# Patient Record
Sex: MALE | Race: WHITE | NOT HISPANIC OR LATINO
[De-identification: names, ages, dates, MRNs, and addresses within clinical notes are randomized per-mention and may not be internally consistent; named-entity substitution may affect disease eponyms.]

---

## 2017-02-23 PROBLEM — Z00.00 ENCOUNTER FOR PREVENTIVE HEALTH EXAMINATION: Status: ACTIVE | Noted: 2017-02-23

## 2017-03-23 PROBLEM — M25.511 RIGHT SHOULDER PAIN, UNSPECIFIED CHRONICITY: Status: ACTIVE | Noted: 2017-03-23

## 2017-03-24 ENCOUNTER — APPOINTMENT (OUTPATIENT)
Dept: ORTHOPEDIC SURGERY | Facility: CLINIC | Age: 52
End: 2017-03-24

## 2017-03-24 VITALS
DIASTOLIC BLOOD PRESSURE: 93 MMHG | HEIGHT: 69 IN | HEART RATE: 65 BPM | BODY MASS INDEX: 37.03 KG/M2 | WEIGHT: 250 LBS | SYSTOLIC BLOOD PRESSURE: 153 MMHG

## 2017-03-24 DIAGNOSIS — M19.019 PRIMARY OSTEOARTHRITIS, UNSPECIFIED SHOULDER: ICD-10-CM

## 2017-03-24 DIAGNOSIS — Z87.09 PERSONAL HISTORY OF OTHER DISEASES OF THE RESPIRATORY SYSTEM: ICD-10-CM

## 2017-03-24 DIAGNOSIS — Z87.891 PERSONAL HISTORY OF NICOTINE DEPENDENCE: ICD-10-CM

## 2017-03-24 DIAGNOSIS — M25.511 PAIN IN RIGHT SHOULDER: ICD-10-CM

## 2017-03-24 DIAGNOSIS — M75.51 BURSITIS OF RIGHT SHOULDER: ICD-10-CM

## 2017-03-24 DIAGNOSIS — Z80.9 FAMILY HISTORY OF MALIGNANT NEOPLASM, UNSPECIFIED: ICD-10-CM

## 2017-03-24 DIAGNOSIS — Z78.9 OTHER SPECIFIED HEALTH STATUS: ICD-10-CM

## 2017-03-24 DIAGNOSIS — Z82.61 FAMILY HISTORY OF ARTHRITIS: ICD-10-CM

## 2017-04-05 ENCOUNTER — FORM ENCOUNTER (OUTPATIENT)
Age: 52
End: 2017-04-05

## 2017-04-06 ENCOUNTER — OUTPATIENT (OUTPATIENT)
Dept: OUTPATIENT SERVICES | Facility: HOSPITAL | Age: 52
LOS: 1 days | End: 2017-04-06
Payer: COMMERCIAL

## 2017-04-06 ENCOUNTER — APPOINTMENT (OUTPATIENT)
Dept: MRI IMAGING | Facility: CLINIC | Age: 52
End: 2017-04-06

## 2017-04-06 DIAGNOSIS — M19.019 PRIMARY OSTEOARTHRITIS, UNSPECIFIED SHOULDER: ICD-10-CM

## 2017-04-06 DIAGNOSIS — M75.51 BURSITIS OF RIGHT SHOULDER: ICD-10-CM

## 2017-04-06 DIAGNOSIS — M25.511 PAIN IN RIGHT SHOULDER: ICD-10-CM

## 2017-04-06 PROCEDURE — 73221 MRI JOINT UPR EXTREM W/O DYE: CPT

## 2018-07-23 PROBLEM — Z78.9 ALCOHOL USE: Status: ACTIVE | Noted: 2017-03-24

## 2020-01-23 ENCOUNTER — APPOINTMENT (OUTPATIENT)
Dept: PULMONOLOGY | Facility: CLINIC | Age: 55
End: 2020-01-23

## 2023-02-04 ENCOUNTER — EMERGENCY (EMERGENCY)
Facility: HOSPITAL | Age: 58
LOS: 1 days | Discharge: HOME | End: 2023-02-04
Attending: EMERGENCY MEDICINE
Payer: COMMERCIAL

## 2023-02-04 VITALS
HEART RATE: 91 BPM | DIASTOLIC BLOOD PRESSURE: 104 MMHG | TEMPERATURE: 98 F | HEIGHT: 69 IN | OXYGEN SATURATION: 96 % | RESPIRATION RATE: 18 BRPM | SYSTOLIC BLOOD PRESSURE: 163 MMHG | WEIGHT: 235.23 LBS

## 2023-02-04 PROCEDURE — 99284 EMERGENCY DEPT VISIT MOD MDM: CPT | Mod: 25

## 2023-02-04 PROCEDURE — 12001 RPR S/N/AX/GEN/TRNK 2.5CM/<: CPT

## 2023-02-04 PROCEDURE — 99283 EMERGENCY DEPT VISIT LOW MDM: CPT

## 2023-02-04 RX ORDER — LIDOCAINE HCL 20 MG/ML
6 VIAL (ML) INJECTION ONCE
Refills: 0 | Status: COMPLETED | OUTPATIENT
Start: 2023-02-04 | End: 2023-02-04

## 2023-02-04 RX ORDER — CEPHALEXIN 500 MG
500 CAPSULE ORAL ONCE
Refills: 0 | Status: DISCONTINUED | OUTPATIENT
Start: 2023-02-04 | End: 2023-02-04

## 2023-02-04 RX ADMIN — Medication 6 MILLILITER(S): at 13:29

## 2023-02-04 RX ADMIN — Medication 100 MILLIGRAM(S): at 13:28

## 2023-02-04 NOTE — ED PROVIDER NOTE - CLINICAL SUMMARY MEDICAL DECISION MAKING FREE TEXT BOX
L hand laceration  -Lac repaired with 3 loose sutures  -Given doxy as ppx since wound is old (allergic to penicillin and cephalosporins)  -Given f/u info for hand incase decrease sensation doesn't resolve  -Discussed wound care and return precautions  -F/u in 10 days for suture removal

## 2023-02-04 NOTE — ED PROVIDER NOTE - MUSCULOSKELETAL, MLM
L thumb with 1.5 cm across base of L thumb, 5/5 strength of thumb in all directions. Sensation intact dorsally, laterally and medially, small area of ventral surface with decrease sensation. Entire depth of wound visualized, no tendon injury or FB.

## 2023-02-04 NOTE — ED PROVIDER NOTE - NS ED ATTENDING STATEMENT MOD
This was a shared visit with the ELAINE. I reviewed and verified the documentation and independently performed the documented:

## 2023-02-04 NOTE — ED PROVIDER NOTE - OBJECTIVE STATEMENT
57 M hx HTN presents to ed c/o L hand laceration at base of L thumb from broken ceramic at 10 PM last night. States he cleaned it out well, came in today because it kept bleeding. C/o decrease sensation to area of ventral surface of L thumb. Denies any other complaints injuries. Unknown last tetanus.

## 2023-02-04 NOTE — ED PROVIDER NOTE - ATTENDING APP SHARED VISIT CONTRIBUTION OF CARE
I agree with the PA's note and was available for any issues/concerns. I was directly involved in patient care. My brief overall assessment is as follows:      56yo M with laceration to hand last night. No tendon injury. wound cleaned and sutured. outpt Follow up

## 2023-02-04 NOTE — ED PROVIDER NOTE - PATIENT PORTAL LINK FT
You can access the FollowMyHealth Patient Portal offered by [facility] by registering at the following website: [email or web address]. By joining [employer]’s FollowMyHealth portal, you will also be able to view your health information using other applications (apps) compatible with our system.

## 2023-02-04 NOTE — ED PROVIDER NOTE - CARE PROVIDER_API CALL
Shweta See)  Orthopaedic Surgery; Surgery of the Hand  [address] [place] [address]  Phone: [phone]  Fax: [phone]  Follow Up Time: 7-10 Days

## 2023-02-04 NOTE — ED ADULT NURSE NOTE - OBJECTIVE STATEMENT
Patient is alert and oriented X4 from home. Patient comes in to the ER from work and states he cut left hand on a piece of ceramic. Laceration observed to the left hand

## 2023-11-24 ENCOUNTER — OFFICE (OUTPATIENT)
Dept: URBAN - METROPOLITAN AREA CLINIC 6 | Facility: CLINIC | Age: 58
Setting detail: OPHTHALMOLOGY
End: 2023-11-24
Payer: COMMERCIAL

## 2023-11-24 DIAGNOSIS — D31.02: ICD-10-CM

## 2023-11-24 DIAGNOSIS — H02.824: ICD-10-CM

## 2023-11-24 DIAGNOSIS — H02.831: ICD-10-CM

## 2023-11-24 DIAGNOSIS — H43.22: ICD-10-CM

## 2023-11-24 DIAGNOSIS — H02.834: ICD-10-CM

## 2023-11-24 DIAGNOSIS — H11.151: ICD-10-CM

## 2023-11-24 PROCEDURE — 92004 COMPRE OPH EXAM NEW PT 1/>: CPT | Performed by: OPHTHALMOLOGY

## 2023-11-24 ASSESSMENT — REFRACTION_MANIFEST
OD_AXIS: 105
OD_VA1: 20/20
OS_VA1: 20/20
OS_AXIS: 075
OD_CYLINDER: -1.75
OS_CYLINDER: -1.00
OS_SPHERE: PLANO
OD_SPHERE: +0.75
OU_VA: 20/20

## 2023-11-24 ASSESSMENT — SPHEQUIV_DERIVED
OD_SPHEQUIV: -0.125
OS_SPHEQUIV: -0.25
OD_SPHEQUIV: -0.125

## 2023-11-24 ASSESSMENT — REFRACTION_AUTOREFRACTION
OD_SPHERE: +0.25
OS_AXIS: 076
OD_CYLINDER: -0.75
OD_AXIS: 106
OS_SPHERE: 0.00
OS_CYLINDER: -0.50

## 2023-11-24 ASSESSMENT — CONFRONTATIONAL VISUAL FIELD TEST (CVF)
OS_FINDINGS: FULL
OD_FINDINGS: FULL

## 2023-11-24 ASSESSMENT — LID POSITION - DERMATOCHALASIS
OS_DERMATOCHALASIS: LUL 1+
OD_DERMATOCHALASIS: RUL 1+

## 2023-12-12 ENCOUNTER — OFFICE (OUTPATIENT)
Dept: URBAN - METROPOLITAN AREA CLINIC 6 | Facility: CLINIC | Age: 58
Setting detail: OPHTHALMOLOGY
End: 2023-12-12
Payer: COMMERCIAL

## 2023-12-12 DIAGNOSIS — H02.834: ICD-10-CM

## 2023-12-12 DIAGNOSIS — H25.13: ICD-10-CM

## 2023-12-12 DIAGNOSIS — H02.824: ICD-10-CM

## 2023-12-12 DIAGNOSIS — H02.831: ICD-10-CM

## 2023-12-12 DIAGNOSIS — H11.151: ICD-10-CM

## 2023-12-12 DIAGNOSIS — D48.9: ICD-10-CM

## 2023-12-12 PROCEDURE — 92285 EXTERNAL OCULAR PHOTOGRAPHY: CPT | Performed by: OPHTHALMOLOGY

## 2023-12-12 PROCEDURE — 99214 OFFICE O/P EST MOD 30 MIN: CPT | Performed by: OPHTHALMOLOGY

## 2023-12-12 ASSESSMENT — REFRACTION_MANIFEST
OS_SPHERE: PLANO
OS_AXIS: 075
OD_SPHERE: +0.75
OD_CYLINDER: -1.75
OS_VA1: 20/20
OD_AXIS: 105
OD_VA1: 20/20
OU_VA: 20/20
OS_CYLINDER: -1.00

## 2023-12-12 ASSESSMENT — LID POSITION - DERMATOCHALASIS
OS_DERMATOCHALASIS: LUL 1+
OD_DERMATOCHALASIS: RUL 1+

## 2023-12-12 ASSESSMENT — REFRACTION_AUTOREFRACTION
OS_AXIS: 076
OD_CYLINDER: -1.00
OD_AXIS: 106
OD_SPHERE: +0.50
OS_SPHERE: 0.00
OS_CYLINDER: -0.50

## 2023-12-12 ASSESSMENT — CONFRONTATIONAL VISUAL FIELD TEST (CVF)
OS_FINDINGS: FULL
OD_FINDINGS: FULL

## 2023-12-12 ASSESSMENT — SPHEQUIV_DERIVED
OD_SPHEQUIV: 0
OD_SPHEQUIV: -0.125
OS_SPHEQUIV: -0.25

## 2024-01-02 ENCOUNTER — NON-APPOINTMENT (OUTPATIENT)
Age: 59
End: 2024-01-02

## 2024-12-25 PROBLEM — F10.90 ALCOHOL USE: Status: ACTIVE | Noted: 2017-03-24

## 2025-02-10 ENCOUNTER — NON-APPOINTMENT (OUTPATIENT)
Age: 60
End: 2025-02-10

## 2025-02-14 ENCOUNTER — INPATIENT (INPATIENT)
Facility: HOSPITAL | Age: 60
LOS: 4 days | Discharge: HOME | DRG: 871 | End: 2025-02-19
Attending: STUDENT IN AN ORGANIZED HEALTH CARE EDUCATION/TRAINING PROGRAM | Admitting: HOSPITALIST
Payer: COMMERCIAL

## 2025-02-14 VITALS
TEMPERATURE: 98 F | OXYGEN SATURATION: 94 % | SYSTOLIC BLOOD PRESSURE: 99 MMHG | HEART RATE: 80 BPM | WEIGHT: 176.37 LBS | DIASTOLIC BLOOD PRESSURE: 59 MMHG | RESPIRATION RATE: 20 BRPM

## 2025-02-14 LAB
APTT BLD: 28.6 SEC — SIGNIFICANT CHANGE UP (ref 24.5–35.6)
HCT VFR BLD CALC: 41.1 % — SIGNIFICANT CHANGE UP (ref 39–50)
HGB BLD-MCNC: 14.5 G/DL — SIGNIFICANT CHANGE UP (ref 13–17)
INR BLD: 1.3 RATIO — HIGH (ref 0.85–1.16)
MCHC RBC-ENTMCNC: 30.8 PG — SIGNIFICANT CHANGE UP (ref 27–34)
MCHC RBC-ENTMCNC: 35.3 G/DL — SIGNIFICANT CHANGE UP (ref 32–36)
MCV RBC AUTO: 87.3 FL — SIGNIFICANT CHANGE UP (ref 80–100)
NRBC # BLD AUTO: 0 K/UL — SIGNIFICANT CHANGE UP (ref 0–0)
NRBC # FLD: 0 K/UL — SIGNIFICANT CHANGE UP (ref 0–0)
NRBC BLD AUTO-RTO: 0 /100 WBCS — SIGNIFICANT CHANGE UP (ref 0–0)
PLATELET # BLD AUTO: 405 K/UL — HIGH (ref 150–400)
PMV BLD: 9.5 FL — SIGNIFICANT CHANGE UP (ref 7–13)
PROTHROM AB SERPL-ACNC: 14.7 SEC — HIGH (ref 9.9–13.4)
RBC # BLD: 4.71 M/UL — SIGNIFICANT CHANGE UP (ref 4.2–5.8)
RBC # FLD: 14.3 % — SIGNIFICANT CHANGE UP (ref 10.3–14.5)
WBC # BLD: 27.75 K/UL — HIGH (ref 3.8–10.5)
WBC # FLD AUTO: 27.75 K/UL — HIGH (ref 3.8–10.5)

## 2025-02-14 PROCEDURE — 71045 X-RAY EXAM CHEST 1 VIEW: CPT | Mod: 26

## 2025-02-14 PROCEDURE — 93010 ELECTROCARDIOGRAM REPORT: CPT

## 2025-02-14 PROCEDURE — 99285 EMERGENCY DEPT VISIT HI MDM: CPT

## 2025-02-14 RX ORDER — CEFEPIME 2 G/20ML
2000 INJECTION, POWDER, FOR SOLUTION INTRAVENOUS ONCE
Refills: 0 | Status: DISCONTINUED | OUTPATIENT
Start: 2025-02-14 | End: 2025-02-15

## 2025-02-14 RX ORDER — VANCOMYCIN HCL IN 5 % DEXTROSE 1.5G/250ML
1000 PLASTIC BAG, INJECTION (ML) INTRAVENOUS ONCE
Refills: 0 | Status: COMPLETED | OUTPATIENT
Start: 2025-02-14 | End: 2025-02-14

## 2025-02-14 NOTE — ED ADULT TRIAGE NOTE - BP NONINVASIVE SYSTOLIC (MM HG)
Problem: Risk for Injury, Hyperbaric Oxygen Therapy (HBO Department)  Goal: Remains free from injury (e.g. oxygen toxicity, fire risk)  Outcome: Monitoring/Evaluating progress     Pt tolerated compression. Encouraged repositioning throughout. HBO treatment tolerated.      99

## 2025-02-14 NOTE — ED ADULT TRIAGE NOTE - CHIEF COMPLAINT QUOTE
pt biba for shortness of breath, diagnosed with pneumonia on doxycycline, sent from urgent care for systolics in the 90s. patient received albuterol with ems

## 2025-02-15 DIAGNOSIS — A41.9 SEPSIS, UNSPECIFIED ORGANISM: ICD-10-CM

## 2025-02-15 LAB
ACANTHOCYTES BLD QL SMEAR: ABNORMAL
ALBUMIN SERPL ELPH-MCNC: 2.1 G/DL — LOW (ref 3.3–5.2)
ALBUMIN SERPL ELPH-MCNC: 2.5 G/DL — LOW (ref 3.3–5.2)
ALP SERPL-CCNC: 105 U/L — SIGNIFICANT CHANGE UP (ref 40–120)
ALP SERPL-CCNC: 127 U/L — HIGH (ref 40–120)
ALT FLD-CCNC: 45 U/L — HIGH
ALT FLD-CCNC: 61 U/L — HIGH
ANION GAP SERPL CALC-SCNC: 15 MMOL/L — SIGNIFICANT CHANGE UP (ref 5–17)
ANION GAP SERPL CALC-SCNC: 17 MMOL/L — SIGNIFICANT CHANGE UP (ref 5–17)
APTT BLD: 34.1 SEC — SIGNIFICANT CHANGE UP (ref 24.5–35.6)
AST SERPL-CCNC: 51 U/L — HIGH
AST SERPL-CCNC: 74 U/L — HIGH
BASOPHILS # BLD AUTO: 0.02 K/UL — SIGNIFICANT CHANGE UP (ref 0–0.2)
BASOPHILS # BLD AUTO: 0.18 K/UL — SIGNIFICANT CHANGE UP (ref 0–0.2)
BASOPHILS # BLD MANUAL: 0 K/UL — SIGNIFICANT CHANGE UP (ref 0–0.2)
BASOPHILS # BLD MANUAL: 0 K/UL — SIGNIFICANT CHANGE UP (ref 0–0.2)
BASOPHILS NFR BLD AUTO: 0.1 % — SIGNIFICANT CHANGE UP (ref 0–2)
BASOPHILS NFR BLD AUTO: 0.6 % — SIGNIFICANT CHANGE UP (ref 0–2)
BASOPHILS NFR BLD MANUAL: 0 % — SIGNIFICANT CHANGE UP (ref 0–2)
BASOPHILS NFR BLD MANUAL: 0 % — SIGNIFICANT CHANGE UP (ref 0–2)
BILIRUB SERPL-MCNC: 0.8 MG/DL — SIGNIFICANT CHANGE UP (ref 0.4–2)
BILIRUB SERPL-MCNC: 0.8 MG/DL — SIGNIFICANT CHANGE UP (ref 0.4–2)
BUN SERPL-MCNC: 37.9 MG/DL — HIGH (ref 8–20)
BUN SERPL-MCNC: 44.1 MG/DL — HIGH (ref 8–20)
BURR CELLS BLD QL SMEAR: SLIGHT — SIGNIFICANT CHANGE UP
CALCIUM SERPL-MCNC: 7.8 MG/DL — LOW (ref 8.4–10.5)
CALCIUM SERPL-MCNC: 8.4 MG/DL — SIGNIFICANT CHANGE UP (ref 8.4–10.5)
CHLORIDE SERPL-SCNC: 102 MMOL/L — SIGNIFICANT CHANGE UP (ref 96–108)
CHLORIDE SERPL-SCNC: 98 MMOL/L — SIGNIFICANT CHANGE UP (ref 96–108)
CO2 SERPL-SCNC: 17 MMOL/L — LOW (ref 22–29)
CO2 SERPL-SCNC: 21 MMOL/L — LOW (ref 22–29)
CREAT SERPL-MCNC: 1.17 MG/DL — SIGNIFICANT CHANGE UP (ref 0.5–1.3)
CREAT SERPL-MCNC: 1.72 MG/DL — HIGH (ref 0.5–1.3)
DACRYOCYTES BLD QL SMEAR: ABNORMAL
EGFR: 45 ML/MIN/1.73M2 — LOW
EGFR: 71 ML/MIN/1.73M2 — SIGNIFICANT CHANGE UP
ELLIPTOCYTES BLD QL SMEAR: ABNORMAL
EOSINOPHIL # BLD AUTO: 0 K/UL — SIGNIFICANT CHANGE UP (ref 0–0.5)
EOSINOPHIL # BLD AUTO: 0 K/UL — SIGNIFICANT CHANGE UP (ref 0–0.5)
EOSINOPHIL # BLD MANUAL: 0 K/UL — SIGNIFICANT CHANGE UP (ref 0–0.5)
EOSINOPHIL # BLD MANUAL: 0 K/UL — SIGNIFICANT CHANGE UP (ref 0–0.5)
EOSINOPHIL NFR BLD AUTO: 0 % — SIGNIFICANT CHANGE UP (ref 0–6)
EOSINOPHIL NFR BLD AUTO: 0 % — SIGNIFICANT CHANGE UP (ref 0–6)
EOSINOPHIL NFR BLD MANUAL: 0 % — SIGNIFICANT CHANGE UP (ref 0–6)
EOSINOPHIL NFR BLD MANUAL: 0 % — SIGNIFICANT CHANGE UP (ref 0–6)
FLUAV AG NPH QL: SIGNIFICANT CHANGE UP
FLUBV AG NPH QL: SIGNIFICANT CHANGE UP
GIANT PLATELETS BLD QL SMEAR: PRESENT
GIANT PLATELETS BLD QL SMEAR: PRESENT
GLUCOSE SERPL-MCNC: 103 MG/DL — HIGH (ref 70–99)
GLUCOSE SERPL-MCNC: 108 MG/DL — HIGH (ref 70–99)
GRAM STN FLD: ABNORMAL
HCT VFR BLD CALC: 39.3 % — SIGNIFICANT CHANGE UP (ref 39–50)
HCT VFR BLD CALC: 39.7 % — SIGNIFICANT CHANGE UP (ref 39–50)
HGB BLD-MCNC: 13.6 G/DL — SIGNIFICANT CHANGE UP (ref 13–17)
HGB BLD-MCNC: 13.7 G/DL — SIGNIFICANT CHANGE UP (ref 13–17)
IMM GRANULOCYTES # BLD AUTO: 0.17 K/UL — HIGH (ref 0–0.07)
IMM GRANULOCYTES # BLD AUTO: 0.37 K/UL — HIGH (ref 0–0.07)
IMM GRANULOCYTES NFR BLD AUTO: 0.6 % — SIGNIFICANT CHANGE UP (ref 0–0.9)
IMM GRANULOCYTES NFR BLD AUTO: 1.2 % — HIGH (ref 0–0.9)
LACTATE SERPL-SCNC: 1.5 MMOL/L — SIGNIFICANT CHANGE UP (ref 0.5–2)
LACTATE SERPL-SCNC: 2.5 MMOL/L — HIGH (ref 0.5–2)
LACTATE SERPL-SCNC: 2.6 MMOL/L — HIGH (ref 0.5–2)
LYMPHOCYTES # BLD AUTO: 0.59 K/UL — LOW (ref 1–3.3)
LYMPHOCYTES # BLD AUTO: 0.7 K/UL — LOW (ref 1–3.3)
LYMPHOCYTES # BLD MANUAL: 0 K/UL — LOW (ref 1–3.3)
LYMPHOCYTES # BLD MANUAL: 0.44 K/UL — LOW (ref 1–3.3)
LYMPHOCYTES NFR BLD AUTO: 1.8 % — LOW (ref 13–44)
LYMPHOCYTES NFR BLD AUTO: 2.5 % — LOW (ref 13–44)
LYMPHOCYTES NFR BLD MANUAL: 0 % — LOW (ref 13–44)
LYMPHOCYTES NFR BLD MANUAL: 1.6 % — LOW (ref 13–44)
MAGNESIUM SERPL-MCNC: 1.8 MG/DL — SIGNIFICANT CHANGE UP (ref 1.6–2.6)
MANUAL METAMYELOCYTE #: 0.54 K/UL — HIGH (ref 0–0)
MANUAL NEUTROPHIL BANDS #: 0.44 K/UL — SIGNIFICANT CHANGE UP (ref 0–0.84)
MANUAL NEUTROPHIL BANDS #: 3.16 K/UL — HIGH (ref 0–0.84)
MANUAL REACTIVE LYMPHOCYTES #: 1.05 K/UL — HIGH (ref 0–0.63)
MCHC RBC-ENTMCNC: 30 PG — SIGNIFICANT CHANGE UP (ref 27–34)
MCHC RBC-ENTMCNC: 30.1 PG — SIGNIFICANT CHANGE UP (ref 27–34)
MCHC RBC-ENTMCNC: 34.3 G/DL — SIGNIFICANT CHANGE UP (ref 32–36)
MCHC RBC-ENTMCNC: 34.9 G/DL — SIGNIFICANT CHANGE UP (ref 32–36)
MCV RBC AUTO: 86.4 FL — SIGNIFICANT CHANGE UP (ref 80–100)
MCV RBC AUTO: 87.4 FL — SIGNIFICANT CHANGE UP (ref 80–100)
METAMYELOCYTES # FLD: 1.7 % — HIGH (ref 0–0)
METAMYELOCYTES NFR BLD: 1.7 % — HIGH (ref 0–0)
METHOD TYPE: SIGNIFICANT CHANGE UP
MONOCYTES # BLD AUTO: 0.32 K/UL — SIGNIFICANT CHANGE UP (ref 0–0.9)
MONOCYTES # BLD AUTO: 0.33 K/UL — SIGNIFICANT CHANGE UP (ref 0–0.9)
MONOCYTES # BLD MANUAL: 0.44 K/UL — SIGNIFICANT CHANGE UP (ref 0–0.9)
MONOCYTES # BLD MANUAL: 1.85 K/UL — HIGH (ref 0–0.9)
MONOCYTES NFR BLD AUTO: 1 % — LOW (ref 2–14)
MONOCYTES NFR BLD AUTO: 1.2 % — LOW (ref 2–14)
MONOCYTES NFR BLD MANUAL: 1.6 % — LOW (ref 2–14)
MONOCYTES NFR BLD MANUAL: 5.8 % — SIGNIFICANT CHANGE UP (ref 2–14)
NEUTROPHILS # BLD AUTO: 26.54 K/UL — HIGH (ref 1.8–7.4)
NEUTROPHILS # BLD AUTO: 30.47 K/UL — HIGH (ref 1.8–7.4)
NEUTROPHILS # BLD MANUAL: 25.33 K/UL — HIGH (ref 1.8–7.4)
NEUTROPHILS # BLD MANUAL: 26.42 K/UL — HIGH (ref 1.8–7.4)
NEUTROPHILS NFR BLD AUTO: 95.4 % — HIGH (ref 43–77)
NEUTROPHILS NFR BLD AUTO: 95.6 % — HIGH (ref 43–77)
NEUTROPHILS NFR BLD MANUAL: 79.3 % — HIGH (ref 43–77)
NEUTROPHILS NFR BLD MANUAL: 95.2 % — HIGH (ref 43–77)
NEUTS BAND # BLD: 1.6 % — SIGNIFICANT CHANGE UP (ref 0–8)
NEUTS BAND # BLD: 9.9 % — HIGH (ref 0–8)
NEUTS BAND NFR BLD: 1.6 % — SIGNIFICANT CHANGE UP (ref 0–8)
NEUTS BAND NFR BLD: 9.9 % — HIGH (ref 0–8)
NRBC # BLD AUTO: 0 K/UL — SIGNIFICANT CHANGE UP (ref 0–0)
NRBC # BLD AUTO: 0 K/UL — SIGNIFICANT CHANGE UP (ref 0–0)
NRBC # FLD: 0 K/UL — SIGNIFICANT CHANGE UP (ref 0–0)
NRBC # FLD: 0 K/UL — SIGNIFICANT CHANGE UP (ref 0–0)
NRBC BLD AUTO-RTO: 0 /100 WBCS — SIGNIFICANT CHANGE UP (ref 0–0)
NRBC BLD AUTO-RTO: 0 /100 WBCS — SIGNIFICANT CHANGE UP (ref 0–0)
OVALOCYTES BLD QL SMEAR: ABNORMAL
PHOSPHATE SERPL-MCNC: 3 MG/DL — SIGNIFICANT CHANGE UP (ref 2.4–4.7)
PLAT MORPH BLD: ABNORMAL
PLAT MORPH BLD: NORMAL — SIGNIFICANT CHANGE UP
PLATELET # BLD AUTO: 419 K/UL — HIGH (ref 150–400)
PLATELET # BLD AUTO: 428 K/UL — HIGH (ref 150–400)
PLATELET COUNT - ESTIMATE: NORMAL — SIGNIFICANT CHANGE UP
PMV BLD: 9.5 FL — SIGNIFICANT CHANGE UP (ref 7–13)
PMV BLD: 9.7 FL — SIGNIFICANT CHANGE UP (ref 7–13)
POIKILOCYTOSIS BLD QL AUTO: ABNORMAL
POIKILOCYTOSIS BLD QL AUTO: SLIGHT — SIGNIFICANT CHANGE UP
POLYCHROMASIA BLD QL SMEAR: SLIGHT — SIGNIFICANT CHANGE UP
POLYCHROMASIA BLD QL SMEAR: SLIGHT — SIGNIFICANT CHANGE UP
POTASSIUM SERPL-MCNC: 3.9 MMOL/L — SIGNIFICANT CHANGE UP (ref 3.5–5.3)
POTASSIUM SERPL-MCNC: 4.1 MMOL/L — SIGNIFICANT CHANGE UP (ref 3.5–5.3)
POTASSIUM SERPL-SCNC: 3.9 MMOL/L — SIGNIFICANT CHANGE UP (ref 3.5–5.3)
POTASSIUM SERPL-SCNC: 4.1 MMOL/L — SIGNIFICANT CHANGE UP (ref 3.5–5.3)
PROCALCITONIN SERPL-MCNC: 4.36 NG/ML — HIGH (ref 0.02–0.1)
PROT SERPL-MCNC: 5.9 G/DL — LOW (ref 6.6–8.7)
PROT SERPL-MCNC: 6.4 G/DL — LOW (ref 6.6–8.7)
RAPID RVP RESULT: SIGNIFICANT CHANGE UP
RBC # BLD: 4.54 M/UL — SIGNIFICANT CHANGE UP (ref 4.2–5.8)
RBC # BLD: 4.55 M/UL — SIGNIFICANT CHANGE UP (ref 4.2–5.8)
RBC # FLD: 14.5 % — SIGNIFICANT CHANGE UP (ref 10.3–14.5)
RBC # FLD: 14.5 % — SIGNIFICANT CHANGE UP (ref 10.3–14.5)
RBC BLD AUTO: ABNORMAL
RBC BLD AUTO: ABNORMAL
RSV RNA NPH QL NAA+NON-PROBE: SIGNIFICANT CHANGE UP
S PNEUM DNA BLD POS QL NAA+NON-PROBE: SIGNIFICANT CHANGE UP
SARS-COV-2 RNA SPEC QL NAA+PROBE: SIGNIFICANT CHANGE UP
SARS-COV-2 RNA SPEC QL NAA+PROBE: SIGNIFICANT CHANGE UP
SODIUM SERPL-SCNC: 134 MMOL/L — LOW (ref 135–145)
SODIUM SERPL-SCNC: 135 MMOL/L — SIGNIFICANT CHANGE UP (ref 135–145)
SPECIMEN SOURCE: SIGNIFICANT CHANGE UP
TSH SERPL-MCNC: 0.51 UIU/ML — SIGNIFICANT CHANGE UP (ref 0.27–4.2)
VARIANT LYMPHS # BLD: 3.3 % — SIGNIFICANT CHANGE UP (ref 0–6)
VARIANT LYMPHS NFR BLD MANUAL: 3.3 % — SIGNIFICANT CHANGE UP (ref 0–6)
WBC # BLD: 28.92 K/UL — HIGH (ref 3.8–10.5)
WBC # BLD: 31.94 K/UL — HIGH (ref 3.8–10.5)
WBC # FLD AUTO: 28.92 K/UL — HIGH (ref 3.8–10.5)
WBC # FLD AUTO: 31.94 K/UL — HIGH (ref 3.8–10.5)

## 2025-02-15 PROCEDURE — 71275 CT ANGIOGRAPHY CHEST: CPT | Mod: 26

## 2025-02-15 PROCEDURE — 99223 1ST HOSP IP/OBS HIGH 75: CPT

## 2025-02-15 RX ORDER — HEPARIN SODIUM 1000 [USP'U]/ML
9000 INJECTION INTRAVENOUS; SUBCUTANEOUS ONCE
Refills: 0 | Status: COMPLETED | OUTPATIENT
Start: 2025-02-15 | End: 2025-02-15

## 2025-02-15 RX ORDER — CEFEPIME 2 G/20ML
2000 INJECTION, POWDER, FOR SOLUTION INTRAVENOUS EVERY 8 HOURS
Refills: 0 | Status: DISCONTINUED | OUTPATIENT
Start: 2025-02-15 | End: 2025-02-15

## 2025-02-15 RX ORDER — DIGOXIN 250 UG/1
250 TABLET ORAL EVERY 8 HOURS
Refills: 0 | Status: COMPLETED | OUTPATIENT
Start: 2025-02-15 | End: 2025-02-15

## 2025-02-15 RX ORDER — SODIUM CHLORIDE 9 G/1000ML
1000 INJECTION, SOLUTION INTRAVENOUS
Refills: 0 | Status: DISCONTINUED | OUTPATIENT
Start: 2025-02-15 | End: 2025-02-15

## 2025-02-15 RX ORDER — METHYLPREDNISOLONE ACETATE 80 MG/ML
40 INJECTION, SUSPENSION INTRA-ARTICULAR; INTRALESIONAL; INTRAMUSCULAR; SOFT TISSUE EVERY 8 HOURS
Refills: 0 | Status: DISCONTINUED | OUTPATIENT
Start: 2025-02-15 | End: 2025-02-17

## 2025-02-15 RX ORDER — BENZONATATE 100 MG
100 CAPSULE ORAL EVERY 8 HOURS
Refills: 0 | Status: DISCONTINUED | OUTPATIENT
Start: 2025-02-15 | End: 2025-02-19

## 2025-02-15 RX ORDER — METOPROLOL SUCCINATE 50 MG/1
5 TABLET, EXTENDED RELEASE ORAL EVERY 6 HOURS
Refills: 0 | Status: DISCONTINUED | OUTPATIENT
Start: 2025-02-15 | End: 2025-02-19

## 2025-02-15 RX ORDER — INFLUENZA A VIRUS A/IDAHO/07/2018 (H1N1) ANTIGEN (MDCK CELL DERIVED, PROPIOLACTONE INACTIVATED, INFLUENZA A VIRUS A/INDIANA/08/2018 (H3N2) ANTIGEN (MDCK CELL DERIVED, PROPIOLACTONE INACTIVATED), INFLUENZA B VIRUS B/SINGAPORE/INFTT-16-0610/2016 ANTIGEN (MDCK CELL DERIVED, PROPIOLACTONE INACTIVATED), INFLUENZA B VIRUS B/IOWA/06/2017 ANTIGEN (MDCK CELL DERIVED, PROPIOLACTONE INACTIVATED) 15; 15; 15; 15 UG/.5ML; UG/.5ML; UG/.5ML; UG/.5ML
0.5 INJECTION, SUSPENSION INTRAMUSCULAR ONCE
Refills: 0 | Status: DISCONTINUED | OUTPATIENT
Start: 2025-02-15 | End: 2025-02-19

## 2025-02-15 RX ORDER — LEVALBUTEROL HYDROCHLORIDE 1.25 MG/3ML
0.63 SOLUTION RESPIRATORY (INHALATION) EVERY 6 HOURS
Refills: 0 | Status: DISCONTINUED | OUTPATIENT
Start: 2025-02-15 | End: 2025-02-19

## 2025-02-15 RX ORDER — SODIUM CHLORIDE 9 G/1000ML
1000 INJECTION, SOLUTION INTRAVENOUS
Refills: 0 | Status: COMPLETED | OUTPATIENT
Start: 2025-02-15 | End: 2025-02-15

## 2025-02-15 RX ORDER — HEPARIN SODIUM 1000 [USP'U]/ML
INJECTION INTRAVENOUS; SUBCUTANEOUS
Qty: 25000 | Refills: 0 | Status: DISCONTINUED | OUTPATIENT
Start: 2025-02-15 | End: 2025-02-18

## 2025-02-15 RX ORDER — MAGNESIUM, ALUMINUM HYDROXIDE 200-200 MG
30 TABLET,CHEWABLE ORAL EVERY 4 HOURS
Refills: 0 | Status: DISCONTINUED | OUTPATIENT
Start: 2025-02-15 | End: 2025-02-19

## 2025-02-15 RX ORDER — HEPARIN SODIUM 1000 [USP'U]/ML
INJECTION INTRAVENOUS; SUBCUTANEOUS
Qty: 25000 | Refills: 0 | Status: DISCONTINUED | OUTPATIENT
Start: 2025-02-15 | End: 2025-02-15

## 2025-02-15 RX ORDER — METOPROLOL SUCCINATE 50 MG/1
5 TABLET, EXTENDED RELEASE ORAL ONCE
Refills: 0 | Status: COMPLETED | OUTPATIENT
Start: 2025-02-15 | End: 2025-02-15

## 2025-02-15 RX ORDER — HEPARIN SODIUM 1000 [USP'U]/ML
5000 INJECTION INTRAVENOUS; SUBCUTANEOUS EVERY 8 HOURS
Refills: 0 | Status: DISCONTINUED | OUTPATIENT
Start: 2025-02-15 | End: 2025-02-15

## 2025-02-15 RX ORDER — ASPIRIN 325 MG
1 TABLET ORAL
Refills: 0 | DISCHARGE

## 2025-02-15 RX ORDER — DIGOXIN 250 UG/1
250 TABLET ORAL DAILY
Refills: 0 | Status: DISCONTINUED | OUTPATIENT
Start: 2025-02-16 | End: 2025-02-19

## 2025-02-15 RX ORDER — HEPARIN SODIUM 1000 [USP'U]/ML
4000 INJECTION INTRAVENOUS; SUBCUTANEOUS EVERY 6 HOURS
Refills: 0 | Status: DISCONTINUED | OUTPATIENT
Start: 2025-02-15 | End: 2025-02-15

## 2025-02-15 RX ORDER — CEFEPIME 2 G/20ML
2000 INJECTION, POWDER, FOR SOLUTION INTRAVENOUS ONCE
Refills: 0 | Status: COMPLETED | OUTPATIENT
Start: 2025-02-15 | End: 2025-02-15

## 2025-02-15 RX ORDER — ONDANSETRON HCL/PF 4 MG/2 ML
4 VIAL (ML) INJECTION EVERY 8 HOURS
Refills: 0 | Status: DISCONTINUED | OUTPATIENT
Start: 2025-02-15 | End: 2025-02-19

## 2025-02-15 RX ORDER — MELATONIN 5 MG
3 TABLET ORAL AT BEDTIME
Refills: 0 | Status: DISCONTINUED | OUTPATIENT
Start: 2025-02-15 | End: 2025-02-19

## 2025-02-15 RX ORDER — METOPROLOL SUCCINATE 50 MG/1
50 TABLET, EXTENDED RELEASE ORAL
Refills: 0 | Status: DISCONTINUED | OUTPATIENT
Start: 2025-02-15 | End: 2025-02-19

## 2025-02-15 RX ORDER — DEXTROMETHORPHAN HBR, GUAIFENESIN 20; 200 MG/10ML; MG/10ML
10 SOLUTION ORAL EVERY 4 HOURS
Refills: 0 | Status: DISCONTINUED | OUTPATIENT
Start: 2025-02-15 | End: 2025-02-19

## 2025-02-15 RX ORDER — METOPROLOL SUCCINATE 50 MG/1
50 TABLET, EXTENDED RELEASE ORAL ONCE
Refills: 0 | Status: COMPLETED | OUTPATIENT
Start: 2025-02-15 | End: 2025-02-15

## 2025-02-15 RX ORDER — CEFEPIME 2 G/20ML
INJECTION, POWDER, FOR SOLUTION INTRAVENOUS
Refills: 0 | Status: DISCONTINUED | OUTPATIENT
Start: 2025-02-15 | End: 2025-02-17

## 2025-02-15 RX ORDER — ACETAMINOPHEN 500 MG/5ML
1000 LIQUID (ML) ORAL ONCE
Refills: 0 | Status: COMPLETED | OUTPATIENT
Start: 2025-02-15 | End: 2025-02-15

## 2025-02-15 RX ORDER — HEPARIN SODIUM 1000 [USP'U]/ML
9000 INJECTION INTRAVENOUS; SUBCUTANEOUS EVERY 6 HOURS
Refills: 0 | Status: DISCONTINUED | OUTPATIENT
Start: 2025-02-15 | End: 2025-02-18

## 2025-02-15 RX ORDER — VANCOMYCIN HCL IN 5 % DEXTROSE 1.5G/250ML
1500 PLASTIC BAG, INJECTION (ML) INTRAVENOUS
Refills: 0 | Status: DISCONTINUED | OUTPATIENT
Start: 2025-02-15 | End: 2025-02-16

## 2025-02-15 RX ORDER — HEPARIN SODIUM 1000 [USP'U]/ML
4000 INJECTION INTRAVENOUS; SUBCUTANEOUS EVERY 6 HOURS
Refills: 0 | Status: DISCONTINUED | OUTPATIENT
Start: 2025-02-15 | End: 2025-02-18

## 2025-02-15 RX ORDER — CEFEPIME 2 G/20ML
2000 INJECTION, POWDER, FOR SOLUTION INTRAVENOUS EVERY 8 HOURS
Refills: 0 | Status: DISCONTINUED | OUTPATIENT
Start: 2025-02-15 | End: 2025-02-17

## 2025-02-15 RX ORDER — HEPARIN SODIUM 1000 [USP'U]/ML
9000 INJECTION INTRAVENOUS; SUBCUTANEOUS EVERY 6 HOURS
Refills: 0 | Status: DISCONTINUED | OUTPATIENT
Start: 2025-02-15 | End: 2025-02-15

## 2025-02-15 RX ORDER — HEPARIN SODIUM 1000 [USP'U]/ML
9000 INJECTION INTRAVENOUS; SUBCUTANEOUS ONCE
Refills: 0 | Status: DISCONTINUED | OUTPATIENT
Start: 2025-02-15 | End: 2025-02-15

## 2025-02-15 RX ORDER — ACETAMINOPHEN 500 MG/5ML
650 LIQUID (ML) ORAL EVERY 6 HOURS
Refills: 0 | Status: DISCONTINUED | OUTPATIENT
Start: 2025-02-15 | End: 2025-02-19

## 2025-02-15 RX ORDER — DILTIAZEM HYDROCHLORIDE 240 MG/1
10 TABLET, EXTENDED RELEASE ORAL ONCE
Refills: 0 | Status: COMPLETED | OUTPATIENT
Start: 2025-02-15 | End: 2025-02-15

## 2025-02-15 RX ADMIN — SODIUM CHLORIDE 75 MILLILITER(S): 9 INJECTION, SOLUTION INTRAVENOUS at 13:57

## 2025-02-15 RX ADMIN — Medication 1000 MILLILITER(S): at 03:53

## 2025-02-15 RX ADMIN — DIGOXIN 250 MICROGRAM(S): 250 TABLET ORAL at 09:12

## 2025-02-15 RX ADMIN — METHYLPREDNISOLONE ACETATE 40 MILLIGRAM(S): 80 INJECTION, SUSPENSION INTRA-ARTICULAR; INTRALESIONAL; INTRAMUSCULAR; SOFT TISSUE at 09:12

## 2025-02-15 RX ADMIN — Medication 400 MILLIGRAM(S): at 02:56

## 2025-02-15 RX ADMIN — Medication 250 MILLIGRAM(S): at 00:37

## 2025-02-15 RX ADMIN — Medication 1000 MILLIGRAM(S): at 01:37

## 2025-02-15 RX ADMIN — HEPARIN SODIUM 1900 UNIT(S)/HR: 1000 INJECTION INTRAVENOUS; SUBCUTANEOUS at 08:55

## 2025-02-15 RX ADMIN — METOPROLOL SUCCINATE 5 MILLIGRAM(S): 50 TABLET, EXTENDED RELEASE ORAL at 03:53

## 2025-02-15 RX ADMIN — DIGOXIN 250 MICROGRAM(S): 250 TABLET ORAL at 21:07

## 2025-02-15 RX ADMIN — METOPROLOL SUCCINATE 50 MILLIGRAM(S): 50 TABLET, EXTENDED RELEASE ORAL at 13:54

## 2025-02-15 RX ADMIN — Medication 300 MILLIGRAM(S): at 21:07

## 2025-02-15 RX ADMIN — Medication 1000 MILLIGRAM(S): at 03:15

## 2025-02-15 RX ADMIN — Medication 1000 MILLILITER(S): at 03:56

## 2025-02-15 RX ADMIN — Medication 650 MILLIGRAM(S): at 21:07

## 2025-02-15 RX ADMIN — Medication 650 MILLIGRAM(S): at 22:07

## 2025-02-15 RX ADMIN — DILTIAZEM HYDROCHLORIDE 10 MILLIGRAM(S): 240 TABLET, EXTENDED RELEASE ORAL at 08:20

## 2025-02-15 RX ADMIN — HEPARIN SODIUM 2400 UNIT(S)/HR: 1000 INJECTION INTRAVENOUS; SUBCUTANEOUS at 17:19

## 2025-02-15 RX ADMIN — Medication 40 MILLIGRAM(S): at 18:11

## 2025-02-15 RX ADMIN — Medication 650 MILLIGRAM(S): at 08:20

## 2025-02-15 RX ADMIN — METOPROLOL SUCCINATE 5 MILLIGRAM(S): 50 TABLET, EXTENDED RELEASE ORAL at 00:38

## 2025-02-15 RX ADMIN — Medication 125 MILLILITER(S): at 05:39

## 2025-02-15 RX ADMIN — Medication 650 MILLIGRAM(S): at 13:56

## 2025-02-15 RX ADMIN — METOPROLOL SUCCINATE 50 MILLIGRAM(S): 50 TABLET, EXTENDED RELEASE ORAL at 05:08

## 2025-02-15 RX ADMIN — Medication 1000 MILLIGRAM(S): at 04:05

## 2025-02-15 RX ADMIN — CEFEPIME 2000 MILLIGRAM(S): 2 INJECTION, POWDER, FOR SOLUTION INTRAVENOUS at 00:38

## 2025-02-15 RX ADMIN — METHYLPREDNISOLONE ACETATE 40 MILLIGRAM(S): 80 INJECTION, SUSPENSION INTRA-ARTICULAR; INTRALESIONAL; INTRAMUSCULAR; SOFT TISSUE at 21:06

## 2025-02-15 RX ADMIN — Medication 100 MILLIGRAM(S): at 09:12

## 2025-02-15 RX ADMIN — METOPROLOL SUCCINATE 50 MILLIGRAM(S): 50 TABLET, EXTENDED RELEASE ORAL at 18:11

## 2025-02-15 RX ADMIN — HEPARIN SODIUM 2400 UNIT(S)/HR: 1000 INJECTION INTRAVENOUS; SUBCUTANEOUS at 19:20

## 2025-02-15 RX ADMIN — CEFEPIME 2000 MILLIGRAM(S): 2 INJECTION, POWDER, FOR SOLUTION INTRAVENOUS at 13:13

## 2025-02-15 RX ADMIN — Medication 100 MILLILITER(S): at 09:09

## 2025-02-15 RX ADMIN — Medication 1000 MILLILITER(S): at 02:56

## 2025-02-15 RX ADMIN — METOPROLOL SUCCINATE 5 MILLIGRAM(S): 50 TABLET, EXTENDED RELEASE ORAL at 18:35

## 2025-02-15 RX ADMIN — HEPARIN SODIUM 5000 UNIT(S): 1000 INJECTION INTRAVENOUS; SUBCUTANEOUS at 08:56

## 2025-02-15 RX ADMIN — METOPROLOL SUCCINATE 5 MILLIGRAM(S): 50 TABLET, EXTENDED RELEASE ORAL at 11:26

## 2025-02-15 RX ADMIN — METOPROLOL SUCCINATE 5 MILLIGRAM(S): 50 TABLET, EXTENDED RELEASE ORAL at 04:08

## 2025-02-15 RX ADMIN — Medication 1000 MILLILITER(S): at 01:00

## 2025-02-15 RX ADMIN — Medication 1000 MILLILITER(S): at 04:53

## 2025-02-15 RX ADMIN — HEPARIN SODIUM 9000 UNIT(S): 1000 INJECTION INTRAVENOUS; SUBCUTANEOUS at 17:18

## 2025-02-15 RX ADMIN — DIGOXIN 250 MICROGRAM(S): 250 TABLET ORAL at 13:13

## 2025-02-15 RX ADMIN — CEFEPIME 2000 MILLIGRAM(S): 2 INJECTION, POWDER, FOR SOLUTION INTRAVENOUS at 21:06

## 2025-02-15 RX ADMIN — METHYLPREDNISOLONE ACETATE 40 MILLIGRAM(S): 80 INJECTION, SUSPENSION INTRA-ARTICULAR; INTRALESIONAL; INTRAMUSCULAR; SOFT TISSUE at 13:13

## 2025-02-15 RX ADMIN — Medication 1000 MILLILITER(S): at 00:00

## 2025-02-15 NOTE — ED ADULT NURSE NOTE - COVID-19 ORDERING FACILITY
[facility]  - [facility]

## 2025-02-15 NOTE — H&P ADULT - ASSESSMENT
60 y/o M w/ PMH of pAF (on ASA only given chadsvasc=1), HTN presented w/ worsening sob and nonproductive cough.  Symptoms started a week a go and was being treated w/ doxycycline for PNA (started on 2/10) but symptoms progressed.  Pt was then started on medrol pack (2/12) but symptoms continued to progress and came in to hospital.  VS in ED significant for HRs 150s-170's, RR 20-30, febrile to Tmax 100.6 but BP WNL.  Clinically toxic appearing, unable to speak full sentences, incresed WOB but no use of accessories, overall poor air entry b/l but Rt rhonchi apprciated.  Initial w/u significant for WBC 27.75 w/ L-shift, LA 2.5-->2.6, AGMA, Cr 1.72.  CTA neg for PE but significant for multifocal PNA.  Recievied 4L IVNS, placed on 125cc maintanance NS and given vanco/cefepime in ED.  Cardio consulted.  Will admit for sepsis and upgrade to SDU.       Sepsis 2/2 multifocal PNA   Acute hypoxic respiratory failure 2/2 multifocal pna   - AF RVR, tachypneic febrile, lactic acidosis and wBC 27K   - Although leukocytosis could be from medrol given outpt but clinically pt is toxic appearing w/ poor air entry b/l, unable to speak full sentences, Rt rhonchi and worsening hypoxia    - CTA negative for PE but noted to have significant Rt sided multifocal PNA and trase Rt pleural effusion as well as mediastinal and rt hilar lymphadenopathy   - Flu/RSV/covid negative but will order full RVP  - Blood Cxs collected in ED   - s/p vanco/cefepime in ED and 4L IVNS   - Desaturating on 5L NC w/ increase wob therefore will place on HFNC  - ABG unsuccessful therefor VBG ordered stat   - Procal elevated supports suspicion for bacterial pna   - Legionella/strep UAgs, mycoplasma PCR and sputum culture ordered   - MRSA pcr pending and if negative d/c vanco and c/w cefepime only   - Clinically still mildly hypovolemic w/ ongoing losses but given pt has received significant amount of IVFs will give short course of gentle IVLR then stop further hydration for now   - Start on IV steroids and taper off as tolerated   - Incentive spirometer and flutter valve ordered   - PRN cough suppressants and expectorants ordered   - Upgraded to SDU w/ low threshold for MICU eval if continues to decompensated or requires continuous BIPAP      AF RVR likely precipitated by sepsis and hypoxia   - Remains w/ rates uncontrolled despite mx rounds of IV metoprolol and IV cardizem   - Discussed w/ cardio and pt was dig loaded but w/ lower dosing given renal function   - Monitor renal function and lytes closely while on dig and f/u dig level   - Will check TSH but likely RVR result of sepsis and hypoxia and should see improvement w/ tx of infection and improvement of O2 sats  - Despite low chadsvasc, discussed w/ cardio and started on heparin gtt for now   - Reassess if long term AC warranted prior to d/c   - TTE ordered to assess LVEF as could have tachycardia induced CM in light of prolonged RVR    - Monitor on telemetry   - Cardiology following and recs noted       JAGRUTI likely prerenal azotemia, improved   - Reepat CMP w/ improved renal function s/p IVFs in ED   - UA w/ microscopy pending   - Monitor renal function I/O's and lytes       AGMA 2/2 sepsis/lactic acidosis, uremia and suspect component of starvation ketosis given  - Corrected AG still elevated at 19.8, serum HCO3 17   - Delta delta consistent w/ pure AGMA which fits suspected etiologies   - LA 2.5-->2.6-->1.5 s/p IVFs in ED   - Uremia improving on repeat labs and likely still elevated due to recent steroids given outpt  - UA pending to assess for ketones   - ABG unsuccessful therefor VBG ordered stat   - Switched to short course of LR given sepsis and serum HCO3   - Monitor CMP to trend       Transaminitis   - Benign abd exam and Tbili low normal   - Considered congestive hepatopathy given amount of IVFs given and RVR   - Clinically hypovolemic w/ ongoing losses from fever therefore will c/w IVFs w/ LR but lower rate and only give 1L then reassess   - If LFTs worsen or symptomatic will get RUQ US       HTN  - Hold ARB for now given soft BP and JAGRUTI   - Hold amlodipine for now given soft BP  - c/w metoprolol for rate control w/ parameters for BP       Incidental CT chest finding  - CTA reported mild fusiform aneurysmal dilatation of the aortic root to 4.1 cm and mid ascending thoracic aorta measures approximately 3.7 cm  - f/u outpt w/ CTSx or vascular for continued monitoring and c/w BP control         VTE ppx:  heparin gtt and prior to d/c discuss w/ cardio if long term tx w/ AC warranted      Dispo: Acute.  Low threshold for MICU eval.

## 2025-02-15 NOTE — ED PROVIDER NOTE - CLINICAL SUMMARY MEDICAL DECISION MAKING FREE TEXT BOX
Carla Titus MD, Attending  60 yo M hx of Afib (no AC), HTN, Smoker (no hx of on COPD, no O2 requirement at baseline), Dx with PNA on xray has been taking ~5 days of doxycycline, pw sob and palpitations. Pt noted to be 91% oN RA, tachycardic to 140-150 Afib in RVR.   Gen: mild increased wob, on 4L NC  Head: NC/AT  Neck: trachea midline  card: Afib rvr 130's, S1, S2  Resp:  + tachypneic, hypoxic to 91% on RA, RLL crackles and mild exp wheeze,   ABd: soft non tender, ND, no rebound no rebound guarding.   Ext: no deformities  Neuro:  A&O appears non focal  Skin:  Warm and dry as visualized  Psych:  Normal affect and mood    ddx includes, but is not limited to the following: sepsis, Afib rvr, electrolyte derangement, PE, anemia.   plan: ed sepsis labs, empric abx, CTA to ro pe, anticipate admission.   update: signed out to PM doc pending CTA and admission.

## 2025-02-15 NOTE — ED PROVIDER NOTE - PROGRESS NOTE DETAILS
Al: Pt received in signout from Dr. Titus. Noted to be febrile to 101.7. Given additional fluids, ofirmev. Tachycardia improved with additional metoprolol. Cardiology consult placed. Admitted to medicine.    Given total of 3 L IVF -- initially given 1 L given concern for possible fluid overload; subsequently given additional 2 L given persistent tachycardia.

## 2025-02-15 NOTE — ED ADULT NURSE NOTE - OBJECTIVE STATEMENT
Pt presents to ed bibems from urgent care for hypotension. Pt ax4, reports being diagnosed with pna and prescribed po doxycycline. Pt reports no relief with abx. Pt now endorsing sob. Pt also endorses palpitations, found to be in afib rvr, Pt moved to critical care for further evaluation. Tele/ maintained, afib rvr, 95%4lnc. Wife @bedside. MD Titus @ bedside. Safety maintained.

## 2025-02-15 NOTE — ED PROVIDER NOTE - CARE PLAN
1 Principal Discharge DX:	Sepsis with acute hypoxic respiratory failure  Secondary Diagnosis:	PNA (pneumonia)  Secondary Diagnosis:	Atrial fibrillation with RVR

## 2025-02-15 NOTE — ED ADULT NURSE NOTE - NSFALLHARMRISKINTERV_ED_ALL_ED

## 2025-02-15 NOTE — ED ADULT NURSE REASSESSMENT NOTE - NS ED NURSE REASSESS COMMENT FT1
PTT drawn and send it to the lab , awaiting results , will titrate Heparin gtt according to nomogram
Pt HR continue to josue between 150's -160's - medicated with additional Metoprolol 5 mg IVP as per MD orders
Pt medicated as per MD orders ,Digoxin IVP given as per MD orders . HR hovering between 140's -150's .RT at the bedside , unable to get ABG , two different RT attempted to get - unable . Dr. Nguyen made aware, She will orders VBG and pt is being put on  high flow as per MD orders . Will continue ot monitor
Pt received in the stretcher HR noted to be between 150's- 160's , temp 100.6 . Dr. Nguyen called for additional orders . Pt appears weak and tried . Md states she is on the way . Awaiting orders. Will medicate patient once orders get put in .

## 2025-02-15 NOTE — PATIENT PROFILE ADULT - VISION (WITH CORRECTIVE LENSES IF THE PATIENT USUALLY WEARS THEM):
History & Physical Update





- History


History: No Change





- Physical


Physical: No Change





- Assessment


Assessment: No Change





- Plan


Plan: No Change (No change in HP)
Detail Level: Detailed
Normal vision: sees adequately in most situations; can see medication labels, newsprint

## 2025-02-15 NOTE — H&P ADULT - NSHPPHYSICALEXAM_GEN_ALL_CORE
GENERAL: pt examined bedside, appears uncomfortable, unable to speak full sentences in moderate distress   HEENT: NC/AT, dry oral mucosa, clear conjunctiva, sclera nonicteric  RESPIRATORY: poor inspiratory effort, increased WOB but no use of accessories, +Rt sided rhonchi but overall poor air entry b/l   CARDIOVASCULAR: irregularly irregular rate and rhytm, normal S1 and S2, no murmur/rub/gallop  ABDOMEN: obese abd, soft, NT/ND, normoactive bowel sounds, no rebound/guarding  MSK: No joint deformities, edema, erythema  EXTREMITIES: No cynaosis, no clubbing, no lower extremity edema  PSYCH: affect appropriate and cooperative  NEUROLOGY: A+O to person, place, and time, no focal neurologic deficits appreciated  SKIN: No rashes or no palpable lesions

## 2025-02-15 NOTE — ED ADULT NURSE NOTE - HISTORY OF COVID-19 VACCINATION
Malnutrition Findings:                         Secondary to CA; c/w calorie supplementation as tolerated           BMI Findings: Body mass index is 18 81 kg/m² 
Offered medication but declines at this time; advised counseling which she is agreeable; referral placed she may also find services available through heme/onc
No

## 2025-02-15 NOTE — ED PROVIDER NOTE - COVID-19 ORDERING FACILITY
[facility]  - [facility]

## 2025-02-15 NOTE — PATIENT PROFILE ADULT - FALL HARM RISK - HARM RISK INTERVENTIONS

## 2025-02-15 NOTE — H&P ADULT - NSHPLABSRESULTS_GEN_ALL_CORE
13.7   31.94 )-----------( 419      ( 15 Feb 2025 08:24 )             39.3         02-15    134[L]  |  102  |  37.9[H]  ----------------------------<  103[H]  4.1   |  17.0[L]  |  1.17    Ca    7.8[L]      15 Feb 2025 08:24  Phos  3.0     02-15  Mg     1.8     02-15    TPro  5.9[L]  /  Alb  2.1[L]  /  TBili  0.8  /  DBili  x   /  AST  74[H]  /  ALT  61[H]  /  AlkPhos  127[H]  02-15        < from: CT Angio Chest PE Protocol w/ IV Cont (02.15.25 @ 01:37) >    IMPRESSION:  Large patchy and confluent airspace consolidations in the right upper and   lower lobes consistent with multifocal pneumonia.  Trace right pleural   effusion.    Mediastinal and right hilar lymphadenopathy, probably reactive to acute   infection.    No pulmonaryembolism.    Mild fusiform aneurysmal dilatation of the aortic root to 4.1 cm in   transverse caliber at the sinus of Valsalva.  Ectatic mid ascending   thoracic aorta measures approximately 3.7 cm in transverse caliber.  The   patient may follow-up with thoracic surgery for long-term management.        --- End of Report ---    < end of copied text >

## 2025-02-15 NOTE — CONSULT NOTE ADULT - SUBJECTIVE AND OBJECTIVE BOX
[place] [facility]                                   [address] [place] [address]                                                      PHONE: [phone]                                                         FAX: [phone]  [email or web address]  ---------------------------------------------------------------------------------------------------------------------------------    Reason for Consult: atrial fibrillation     HPI:  DENISE HARRIS is an 59y Male with a history of paroxysmal atrial fibrillation, hypertension, obesity who was BIBEMS from urgent care for low sBP 90s and shortness of breath. Initial VS 99/59, subsequently noted to be febrile to 101 and with rapid ventricular response in 130s. CTA negative for PE, showing evidence of multifocal pneumonia.     Cardio: LYNETTE Kelly    PAST MEDICAL & SURGICAL HISTORY:      penicillin (Unknown)      MEDICATIONS  (STANDING):  sodium chloride 0.9%. 1000 milliLiter(s) (125 mL/Hr) IV Continuous <Continuous>    MEDICATIONS  (PRN):      Social History:      ROS: Negative other than as mentioned in HPI.    Vital Signs Last 24 Hrs  T(C): 37.4 (15 Feb 2025 03:51), Max: 38.7 (15 Feb 2025 02:02)  T(F): 99.3 (15 Feb 2025 03:51), Max: 101.7 (15 Feb 2025 02:02)  HR: 142 (15 Feb 2025 05:04) (80 - 180)  BP: 107/71 (15 Feb 2025 05:04) (99/59 - 126/70)  BP(mean): --  RR: 21 (15 Feb 2025 05:04) (17 - 22)  SpO2: 99% (15 Feb 2025 05:04) (92% - 99%)    Parameters below as of 15 Feb 2025 05:04  Patient On (Oxygen Delivery Method): nasal cannula      ICU Vital Signs Last 24 Hrs  DENISE HARRIS  I&O's Detail    I&O's Summary    Drug Dosing Weight  DENISE HARRIS      PHYSICAL EXAM:  General: Appears comfortable, alert and cooperative.  HEENT: Head; normocephalic, atraumatic.  Eyes: Pupils reactive, cornea wnl.  Neck: Supple, no adenopathy, no NVD or carotid bruit or thyromegaly.  CARDIOVASCULAR: Regular S1 S2 with no murmur, rub, gallop or lift.   LUNGS: No rales, rhonchi or wheeze. Normal breath sounds bilaterally.  ABDOMEN: Soft, nontender without mass or organomegaly. bowel sounds normoactive.  EXTREMITIES: No clubbing, cyanosis or edema. Distal pulses wnl.   SKIN: warm and dry with normal turgor.  NEURO: Alert/oriented x 3/normal motor exam. No pathologic reflexes.    PSYCH: Appropriate affect.        LABS:                        14.5   27.75 )-----------( 405      ( 14 Feb 2025 22:55 )             41.1     02-14    135  |  98  |  44.1[H]  ----------------------------<  108[H]  3.9   |  21.0[L]  |  1.72[H]    Ca    8.4      14 Feb 2025 22:55    TPro  6.4[L]  /  Alb  2.5[L]  /  TBili  0.8  /  DBili  x   /  AST  51[H]  /  ALT  45[H]  /  AlkPhos  105  02-14    DENISE HARRIS      PT/INR - ( 14 Feb 2025 22:55 )   PT: 14.7 sec;   INR: 1.30 ratio         PTT - ( 14 Feb 2025 22:55 )  PTT:28.6 sec  Urinalysis Basic - ( 14 Feb 2025 22:55 )    Color: x / Appearance: x / SG: x / pH: x  Gluc: 108 mg/dL / Ketone: x  / Bili: x / Urobili: x   Blood: x / Protein: x / Nitrite: x   Leuk Esterase: x / RBC: x / WBC x   Sq Epi: x / Non Sq Epi: x / Bacteria: x        RADIOLOGY & ADDITIONAL STUDIES:    INTERPRETATION OF TELEMETRY (personally reviewed):    ECG:    ECHO:  4/2023  Summary: 1. Left ventricle: The cavity size is normal. Normal thickness is present. There are no regional wall motion abnormalities present. The left ventricular ejection fraction is normal. The LVEF was determined by visual estimate. Left ventricular ejection fraction is 65-70%. Qualitatively the left ventricular ejection function appears to be normal. The left ventricular filling pattern is normal. There is no evidence of diastolic dysfunction present. There is no evidence of a thrombus in the left ventricle. 2. Right ventricle: The cavity size is normal. Right ventricular systolic function is normal. 3. Mitral valve: The annulus is noncalcified. The leaflets are normal thickness. There is no evidence of mitral valve stenosis. There is no evidence of mitral valve regurgitation. 4. Aortic valve: The valve is trileaflet. The leaflets are normal thickness. There is no aortic stenosis. There is no valvular aortic regurgitation. 5. Tricuspid valve: The tricuspid leaflets are not thickened. There is no tricuspid valve regurgitation. 6. Pericardium, extracardiac: There is no pericardial effusion      STRESS TEST:    CARDIAC CATHETERIZATION:    Assessment and Plan:  In summary, DENISE HARRIS is an 59y Male with past medical history significant for                    [place] [facility]                                   [address] [place] [address]                                                      PHONE: [phone]                                                         FAX: [phone]  [email or web address]  ---------------------------------------------------------------------------------------------------------------------------------    Reason for Consult: atrial fibrillation     HPI:  DENISE HARRIS is an 59y Male with a history of paroxysmal atrial fibrillation, hypertension, obesity who was BIBEMS from urgent care for low sBP 90s and shortness of breath. Was having symptoms for 5 days, diagnosed with pneumonia. Went to urgent care as wasn't improving. On arrival to hospital, initial VS 99/59, subsequently noted to be febrile to 101 and with rapid ventricular response in 130s. CTA negative for PE, showing evidence of multifocal pneumonia. Given multiple rounds of beta blocker and CCB for rate control with transient improvement, HR remains elevated at 150-160s on interview, complains of palps, no chest pain.    Cardio: LYNETTE Kelly    PAST MEDICAL & SURGICAL HISTORY:      penicillin (Unknown)      MEDICATIONS  (STANDING):  sodium chloride 0.9%. 1000 milliLiter(s) (125 mL/Hr) IV Continuous <Continuous>    MEDICATIONS  (PRN):      Social History:      ROS: Negative other than as mentioned in HPI.    Vital Signs Last 24 Hrs  T(C): 37.4 (15 Feb 2025 03:51), Max: 38.7 (15 Feb 2025 02:02)  T(F): 99.3 (15 Feb 2025 03:51), Max: 101.7 (15 Feb 2025 02:02)  HR: 142 (15 Feb 2025 05:04) (80 - 180)  BP: 107/71 (15 Feb 2025 05:04) (99/59 - 126/70)  BP(mean): --  RR: 21 (15 Feb 2025 05:04) (17 - 22)  SpO2: 99% (15 Feb 2025 05:04) (92% - 99%)    Parameters below as of 15 Feb 2025 05:04  Patient On (Oxygen Delivery Method): nasal cannula      ICU Vital Signs Last 24 Hrs  DENISE KIMBERLY  I&O's Detail    I&O's Summary    Drug Dosing Weight  DENISE HARRIS      PHYSICAL EXAM:  General: Uncomfortable, diaphoretic  HEENT: Head; normocephalic, atraumatic.  Eyes: Pupils reactive, cornea wnl.  Neck: Supple, no adenopathy, no NVD or carotid bruit or thyromegaly.  CARDIOVASCULAR: Tachycardic, irr irr  LUNGS: Decreased BS at bases  ABDOMEN: Soft, nontender without mass or organomegaly. bowel sounds normoactive.  EXTREMITIES: No clubbing, cyanosis or edema. Distal pulses wnl.   SKIN: warm and dry with normal turgor.  NEURO: Alert/oriented x 3/normal motor exam. No pathologic reflexes.    PSYCH: Appropriate affect.        LABS:                        14.5   27.75 )-----------( 405      ( 14 Feb 2025 22:55 )             41.1     02-14    135  |  98  |  44.1[H]  ----------------------------<  108[H]  3.9   |  21.0[L]  |  1.72[H]    Ca    8.4      14 Feb 2025 22:55    TPro  6.4[L]  /  Alb  2.5[L]  /  TBili  0.8  /  DBili  x   /  AST  51[H]  /  ALT  45[H]  /  AlkPhos  105  02-14    DENISE KIMBERLY      PT/INR - ( 14 Feb 2025 22:55 )   PT: 14.7 sec;   INR: 1.30 ratio         PTT - ( 14 Feb 2025 22:55 )  PTT:28.6 sec  Urinalysis Basic - ( 14 Feb 2025 22:55 )    Color: x / Appearance: x / SG: x / pH: x  Gluc: 108 mg/dL / Ketone: x  / Bili: x / Urobili: x   Blood: x / Protein: x / Nitrite: x   Leuk Esterase: x / RBC: x / WBC x   Sq Epi: x / Non Sq Epi: x / Bacteria: x    INTERPRETATION OF TELEMETRY (personally reviewed): atrial fibrillation with rapid ventricular response     ECHO:  4/2023  Summary: 1. Left ventricle: The cavity size is normal. Normal thickness is present. There are no regional wall motion abnormalities present. The left ventricular ejection fraction is normal. The LVEF was determined by visual estimate. Left ventricular ejection fraction is 65-70%. Qualitatively the left ventricular ejection function appears to be normal. The left ventricular filling pattern is normal. There is no evidence of diastolic dysfunction present. There is no evidence of a thrombus in the left ventricle. 2. Right ventricle: The cavity size is normal. Right ventricular systolic function is normal. 3. Mitral valve: The annulus is noncalcified. The leaflets are normal thickness. There is no evidence of mitral valve stenosis. There is no evidence of mitral valve regurgitation. 4. Aortic valve: The valve is trileaflet. The leaflets are normal thickness. There is no aortic stenosis. There is no valvular aortic regurgitation. 5. Tricuspid valve: The tricuspid leaflets are not thickened. There is no tricuspid valve regurgitation. 6. Pericardium, extracardiac: There is no pericardial effusion      Assessment and Plan:  In summary, DENISE HARRIS is an 59y Male with a history of paroxysmal atrial fibrillation, hypertension, obesity who was BIBEMS from urgent care for low sBP 90s and shortness of breath, found to febrile to 101 and sBP 90s. CTA negative for PE, showing evidence of multifocal pneumonia. Remains in atrial fibrillation with rapid ventricular response despite BB and CCB    - Start digoxin 0.25 mg IV q6h x 3 doses  - Start digoxin 0.25 mg PO daily starting tomorrow  - If sBP > 110, can given diltiazem 30 mg q6h for rate control. Increase as BP tolerates  - CHADSVASc 1, will start heparin gtt. Will readdress long term AC once clinically improves  - Telemetry monitoring  - Will follow

## 2025-02-15 NOTE — H&P ADULT - HISTORY OF PRESENT ILLNESS
60 y/o M w/ PMH of pAF (on ASA only given chadsvasc=1), HTN presented w/ worsening sob and nonproductive cough.  Symptoms started a week a go and was being treated w/ doxycycline for PNA (started on 2/10) but symptoms progressed.  Pt was then started on medrol pack (2/12) but symptoms continued to progress and came in to hospital.  Pt has also had subjective fevers, chills, weakness and poor po intake.  He denies recent travel, sick contacts, hospitalizations, denies hemoptysis, cp, palpitations, LE edema/pain, orthopnea, abd pain, N/V, diarrhea.

## 2025-02-16 LAB
ALBUMIN SERPL ELPH-MCNC: 2.2 G/DL — LOW (ref 3.3–5.2)
ALP SERPL-CCNC: 145 U/L — HIGH (ref 40–120)
ALT FLD-CCNC: 50 U/L — HIGH
ANION GAP SERPL CALC-SCNC: 13 MMOL/L — SIGNIFICANT CHANGE UP (ref 5–17)
APPEARANCE UR: CLEAR — SIGNIFICANT CHANGE UP
APTT BLD: 39.1 SEC — HIGH (ref 24.5–35.6)
APTT BLD: 42.1 SEC — HIGH (ref 24.5–35.6)
APTT BLD: 48.3 SEC — HIGH (ref 24.5–35.6)
APTT BLD: 62.9 SEC — HIGH (ref 24.5–35.6)
AST SERPL-CCNC: 30 U/L — SIGNIFICANT CHANGE UP
BACTERIA # UR AUTO: NEGATIVE /HPF — SIGNIFICANT CHANGE UP
BASOPHILS # BLD AUTO: 0.06 K/UL — SIGNIFICANT CHANGE UP (ref 0–0.2)
BASOPHILS NFR BLD AUTO: 0.3 % — SIGNIFICANT CHANGE UP (ref 0–2)
BILIRUB SERPL-MCNC: 0.4 MG/DL — SIGNIFICANT CHANGE UP (ref 0.4–2)
BILIRUB UR-MCNC: NEGATIVE — SIGNIFICANT CHANGE UP
BUN SERPL-MCNC: 37.2 MG/DL — HIGH (ref 8–20)
CALCIUM SERPL-MCNC: 8.2 MG/DL — LOW (ref 8.4–10.5)
CAST: 2 /LPF — SIGNIFICANT CHANGE UP (ref 0–4)
CHLORIDE SERPL-SCNC: 102 MMOL/L — SIGNIFICANT CHANGE UP (ref 96–108)
CO2 SERPL-SCNC: 20 MMOL/L — LOW (ref 22–29)
COLOR SPEC: YELLOW — SIGNIFICANT CHANGE UP
CREAT SERPL-MCNC: 1.03 MG/DL — SIGNIFICANT CHANGE UP (ref 0.5–1.3)
DIFF PNL FLD: NEGATIVE — SIGNIFICANT CHANGE UP
DIGOXIN SERPL-MCNC: 0.6 NG/ML — LOW (ref 0.8–2)
EGFR: 83 ML/MIN/1.73M2 — SIGNIFICANT CHANGE UP
EOSINOPHIL # BLD AUTO: 0 K/UL — SIGNIFICANT CHANGE UP (ref 0–0.5)
EOSINOPHIL NFR BLD AUTO: 0 % — SIGNIFICANT CHANGE UP (ref 0–6)
GAS PNL BLDV: SIGNIFICANT CHANGE UP
GLUCOSE SERPL-MCNC: 169 MG/DL — HIGH (ref 70–99)
GLUCOSE UR QL: NEGATIVE MG/DL — SIGNIFICANT CHANGE UP
GRAM STN FLD: ABNORMAL
HCT VFR BLD CALC: 40.8 % — SIGNIFICANT CHANGE UP (ref 39–50)
HGB BLD-MCNC: 13.5 G/DL — SIGNIFICANT CHANGE UP (ref 13–17)
IMM GRANULOCYTES # BLD AUTO: 0.45 K/UL — HIGH (ref 0–0.07)
IMM GRANULOCYTES NFR BLD AUTO: 2.2 % — HIGH (ref 0–0.9)
KETONES UR-MCNC: NEGATIVE MG/DL — SIGNIFICANT CHANGE UP
LEGIONELLA AG UR QL: NEGATIVE — SIGNIFICANT CHANGE UP
LEUKOCYTE ESTERASE UR-ACNC: NEGATIVE — SIGNIFICANT CHANGE UP
LYMPHOCYTES # BLD AUTO: 0.81 K/UL — LOW (ref 1–3.3)
LYMPHOCYTES NFR BLD AUTO: 3.9 % — LOW (ref 13–44)
MAGNESIUM SERPL-MCNC: 2.4 MG/DL — SIGNIFICANT CHANGE UP (ref 1.6–2.6)
MCHC RBC-ENTMCNC: 29.2 PG — SIGNIFICANT CHANGE UP (ref 27–34)
MCHC RBC-ENTMCNC: 33.1 G/DL — SIGNIFICANT CHANGE UP (ref 32–36)
MCV RBC AUTO: 88.3 FL — SIGNIFICANT CHANGE UP (ref 80–100)
MONOCYTES # BLD AUTO: 0.38 K/UL — SIGNIFICANT CHANGE UP (ref 0–0.9)
MONOCYTES NFR BLD AUTO: 1.8 % — LOW (ref 2–14)
MRSA PCR RESULT.: SIGNIFICANT CHANGE UP
NEUTROPHILS # BLD AUTO: 18.99 K/UL — HIGH (ref 1.8–7.4)
NEUTROPHILS NFR BLD AUTO: 91.8 % — HIGH (ref 43–77)
NITRITE UR-MCNC: NEGATIVE — SIGNIFICANT CHANGE UP
NRBC # BLD AUTO: 0 K/UL — SIGNIFICANT CHANGE UP (ref 0–0)
NRBC # FLD: 0 K/UL — SIGNIFICANT CHANGE UP (ref 0–0)
NRBC BLD AUTO-RTO: 0 /100 WBCS — SIGNIFICANT CHANGE UP (ref 0–0)
PH UR: 6.5 — SIGNIFICANT CHANGE UP (ref 5–8)
PHOSPHATE SERPL-MCNC: 3 MG/DL — SIGNIFICANT CHANGE UP (ref 2.4–4.7)
PLATELET # BLD AUTO: 513 K/UL — HIGH (ref 150–400)
PMV BLD: 9.5 FL — SIGNIFICANT CHANGE UP (ref 7–13)
POTASSIUM SERPL-MCNC: 4.1 MMOL/L — SIGNIFICANT CHANGE UP (ref 3.5–5.3)
POTASSIUM SERPL-SCNC: 4.1 MMOL/L — SIGNIFICANT CHANGE UP (ref 3.5–5.3)
PROT SERPL-MCNC: 6 G/DL — LOW (ref 6.6–8.7)
PROT UR-MCNC: 30 MG/DL
RBC # BLD: 4.62 M/UL — SIGNIFICANT CHANGE UP (ref 4.2–5.8)
RBC # FLD: 14.6 % — HIGH (ref 10.3–14.5)
RBC CASTS # UR COMP ASSIST: 2 /HPF — SIGNIFICANT CHANGE UP (ref 0–4)
S AUREUS DNA NOSE QL NAA+PROBE: SIGNIFICANT CHANGE UP
SODIUM SERPL-SCNC: 135 MMOL/L — SIGNIFICANT CHANGE UP (ref 135–145)
SP GR SPEC: 1.02 — SIGNIFICANT CHANGE UP (ref 1–1.03)
SQUAMOUS # UR AUTO: 3 /HPF — SIGNIFICANT CHANGE UP (ref 0–5)
UROBILINOGEN FLD QL: 1 MG/DL — SIGNIFICANT CHANGE UP (ref 0.2–1)
WBC # BLD: 20.69 K/UL — HIGH (ref 3.8–10.5)
WBC # FLD AUTO: 20.69 K/UL — HIGH (ref 3.8–10.5)
WBC UR QL: 0 /HPF — SIGNIFICANT CHANGE UP (ref 0–5)

## 2025-02-16 PROCEDURE — 99233 SBSQ HOSP IP/OBS HIGH 50: CPT

## 2025-02-16 RX ORDER — DILTIAZEM HYDROCHLORIDE 240 MG/1
30 TABLET, EXTENDED RELEASE ORAL EVERY 6 HOURS
Refills: 0 | Status: DISCONTINUED | OUTPATIENT
Start: 2025-02-16 | End: 2025-02-17

## 2025-02-16 RX ORDER — DILTIAZEM HYDROCHLORIDE 240 MG/1
10 TABLET, EXTENDED RELEASE ORAL ONCE
Refills: 0 | Status: COMPLETED | OUTPATIENT
Start: 2025-02-16 | End: 2025-02-16

## 2025-02-16 RX ADMIN — DIGOXIN 250 MICROGRAM(S): 250 TABLET ORAL at 07:59

## 2025-02-16 RX ADMIN — METOPROLOL SUCCINATE 50 MILLIGRAM(S): 50 TABLET, EXTENDED RELEASE ORAL at 05:27

## 2025-02-16 RX ADMIN — METOPROLOL SUCCINATE 5 MILLIGRAM(S): 50 TABLET, EXTENDED RELEASE ORAL at 14:08

## 2025-02-16 RX ADMIN — HEPARIN SODIUM 2800 UNIT(S)/HR: 1000 INJECTION INTRAVENOUS; SUBCUTANEOUS at 07:35

## 2025-02-16 RX ADMIN — DILTIAZEM HYDROCHLORIDE 10 MILLIGRAM(S): 240 TABLET, EXTENDED RELEASE ORAL at 13:02

## 2025-02-16 RX ADMIN — CEFEPIME 2000 MILLIGRAM(S): 2 INJECTION, POWDER, FOR SOLUTION INTRAVENOUS at 13:01

## 2025-02-16 RX ADMIN — METHYLPREDNISOLONE ACETATE 40 MILLIGRAM(S): 80 INJECTION, SUSPENSION INTRA-ARTICULAR; INTRALESIONAL; INTRAMUSCULAR; SOFT TISSUE at 13:01

## 2025-02-16 RX ADMIN — HEPARIN SODIUM 4000 UNIT(S): 1000 INJECTION INTRAVENOUS; SUBCUTANEOUS at 07:03

## 2025-02-16 RX ADMIN — METOPROLOL SUCCINATE 50 MILLIGRAM(S): 50 TABLET, EXTENDED RELEASE ORAL at 18:02

## 2025-02-16 RX ADMIN — DILTIAZEM HYDROCHLORIDE 30 MILLIGRAM(S): 240 TABLET, EXTENDED RELEASE ORAL at 23:59

## 2025-02-16 RX ADMIN — HEPARIN SODIUM 3300 UNIT(S)/HR: 1000 INJECTION INTRAVENOUS; SUBCUTANEOUS at 19:46

## 2025-02-16 RX ADMIN — CEFEPIME 2000 MILLIGRAM(S): 2 INJECTION, POWDER, FOR SOLUTION INTRAVENOUS at 05:27

## 2025-02-16 RX ADMIN — HEPARIN SODIUM 2800 UNIT(S)/HR: 1000 INJECTION INTRAVENOUS; SUBCUTANEOUS at 07:00

## 2025-02-16 RX ADMIN — METHYLPREDNISOLONE ACETATE 40 MILLIGRAM(S): 80 INJECTION, SUSPENSION INTRA-ARTICULAR; INTRALESIONAL; INTRAMUSCULAR; SOFT TISSUE at 21:18

## 2025-02-16 RX ADMIN — CEFEPIME 2000 MILLIGRAM(S): 2 INJECTION, POWDER, FOR SOLUTION INTRAVENOUS at 21:18

## 2025-02-16 RX ADMIN — DILTIAZEM HYDROCHLORIDE 30 MILLIGRAM(S): 240 TABLET, EXTENDED RELEASE ORAL at 18:02

## 2025-02-16 RX ADMIN — HEPARIN SODIUM 3300 UNIT(S)/HR: 1000 INJECTION INTRAVENOUS; SUBCUTANEOUS at 13:44

## 2025-02-16 RX ADMIN — HEPARIN SODIUM 3300 UNIT(S)/HR: 1000 INJECTION INTRAVENOUS; SUBCUTANEOUS at 21:29

## 2025-02-16 RX ADMIN — HEPARIN SODIUM 4000 UNIT(S): 1000 INJECTION INTRAVENOUS; SUBCUTANEOUS at 00:18

## 2025-02-16 RX ADMIN — HEPARIN SODIUM 3300 UNIT(S)/HR: 1000 INJECTION INTRAVENOUS; SUBCUTANEOUS at 23:54

## 2025-02-16 RX ADMIN — METHYLPREDNISOLONE ACETATE 40 MILLIGRAM(S): 80 INJECTION, SUSPENSION INTRA-ARTICULAR; INTRALESIONAL; INTRAMUSCULAR; SOFT TISSUE at 05:27

## 2025-02-16 RX ADMIN — HEPARIN SODIUM 2600 UNIT(S)/HR: 1000 INJECTION INTRAVENOUS; SUBCUTANEOUS at 00:15

## 2025-02-16 RX ADMIN — HEPARIN SODIUM 9000 UNIT(S): 1000 INJECTION INTRAVENOUS; SUBCUTANEOUS at 13:45

## 2025-02-16 RX ADMIN — Medication 40 MILLIGRAM(S): at 05:27

## 2025-02-16 RX ADMIN — METOPROLOL SUCCINATE 5 MILLIGRAM(S): 50 TABLET, EXTENDED RELEASE ORAL at 02:50

## 2025-02-16 RX ADMIN — DILTIAZEM HYDROCHLORIDE 30 MILLIGRAM(S): 240 TABLET, EXTENDED RELEASE ORAL at 13:01

## 2025-02-16 NOTE — PHARMACOTHERAPY INTERVENTION NOTE - COMMENTS
Recommended to discontinue vancomycin since the MRSA PCR was negative.    Gisela Avila, PharmD, BCIDP  Clinical Pharmacy Specialist, Infectious Diseases  Tele-Antimicrobial Stewardship Program (Tele-ASP)  Tele-ASP Phone: [phone]

## 2025-02-16 NOTE — PROGRESS NOTE ADULT - ASSESSMENT
60 y/o M w/ PMH of pAF (on ASA only given chadsvasc=1), HTN presented w/ worsening sob and nonproductive cough.  Symptoms started a week a go and was being treated w/ doxycycline for PNA (started on 2/10) but symptoms progressed.  Pt was then started on medrol pack (2/12) but symptoms continued to progress and came in to hospital.  VS in ED significant for HRs 150s-170's, RR 20-30, febrile to Tmax 100.6 but BP WNL.  Clinically toxic appearing, unable to speak full sentences, incresed WOB but no use of accessories, overall poor air entry b/l but Rt rhonchi apprciated.  Initial w/u significant for WBC 27.75 w/ L-shift, LA 2.5-->2.6, AGMA, Cr 1.72.  CTA neg for PE but significant for multifocal PNA.  Recievied 4L IVNS, placed on 125cc maintanance NS and given vanco/cefepime in ED.  Cardio consulted.  Will admit for sepsis and upgrade to SDU.       Sepsis 2/2 multifocal PNA   Acute hypoxic respiratory failure 2/2 multifocal pna   - improvement in leukocytosis  - patient endorses improvement in respiratory, still requiring HFNC at 60% FiO2  - c/w steroids  - c/w abx  - Tele/  - BCx positive for strep pneumoniae      AF RVR likely precipitated by sepsis and hypoxia   - improving w/ BB and digoxin  - discussed cards, if HR still above >110, start cardizem tomorrow  - tele monitoring      JAGRUTI likely prerenal azotemia, improved   - resolved      AGMA 2/2 sepsis/lactic acidosis, uremia and suspect component of starvation ketosis given  - improving, monitor creatinine  - may need another ABG if no improvement      Transaminitis   - Benign abd exam and Tbili low normal   - Considered congestive hepatopathy given amount of IVFs given and RVR   - Clinically hypovolemic w/ ongoing losses from fever therefore will c/w IVFs w/ LR but lower rate and only give 1L then reassess   - If LFTs worsen or symptomatic will get RUQ US       HTN  - Hold ARB for now given soft BP and JAGRUTI   - Hold amlodipine for now given soft BP  - c/w metoprolol for rate control w/ parameters for BP       Incidental CT chest finding  - CTA reported mild fusiform aneurysmal dilatation of the aortic root to 4.1 cm and mid ascending thoracic aorta measures approximately 3.7 cm  - f/u outpt w/ CTSx or vascular for continued monitoring and c/w BP control         VTE ppx:  heparin gtt and prior to d/c discuss w/ cardio if long term tx w/ AC warranted      Dispo: Medically active, needs 3-4 days more

## 2025-02-17 ENCOUNTER — RESULT REVIEW (OUTPATIENT)
Age: 60
End: 2025-02-17

## 2025-02-17 LAB
-  CEFTRIAXONE (MENINGITIDIS): SIGNIFICANT CHANGE UP
-  CEFTRIAXONE (NON-MENINGITIDIS): SIGNIFICANT CHANGE UP
-  CLINDAMYCIN: SIGNIFICANT CHANGE UP
-  ERYTHROMYCIN: SIGNIFICANT CHANGE UP
-  LEVOFLOXACIN: SIGNIFICANT CHANGE UP
-  PENICILLIN (MENINGITIDIS): SIGNIFICANT CHANGE UP
-  PENICILLIN (NON-MENINGITIDIS): SIGNIFICANT CHANGE UP
-  PENICILLIN (ORAL PENICILLIN V): SIGNIFICANT CHANGE UP
-  TETRACYCLINE: SIGNIFICANT CHANGE UP
-  TRIMETHOPRIM/SULFAMETHOXAZOLE: SIGNIFICANT CHANGE UP
-  VANCOMYCIN: SIGNIFICANT CHANGE UP
APTT BLD: 76.2 SEC — HIGH (ref 24.5–35.6)
BASOPHILS # BLD AUTO: 0.03 K/UL — SIGNIFICANT CHANGE UP (ref 0–0.2)
BASOPHILS NFR BLD AUTO: 0.2 % — SIGNIFICANT CHANGE UP (ref 0–2)
BUN SERPL-MCNC: 36.9 MG/DL — HIGH (ref 8–20)
CALCIUM SERPL-MCNC: 7.8 MG/DL — LOW (ref 8.4–10.5)
CHLORIDE SERPL-SCNC: 101 MMOL/L — SIGNIFICANT CHANGE UP (ref 96–108)
CO2 SERPL-SCNC: 22 MMOL/L — SIGNIFICANT CHANGE UP (ref 22–29)
CREAT SERPL-MCNC: 0.92 MG/DL — SIGNIFICANT CHANGE UP (ref 0.5–1.3)
CULTURE RESULTS: ABNORMAL
EGFR: 95 ML/MIN/1.73M2 — SIGNIFICANT CHANGE UP
EOSINOPHIL # BLD AUTO: 0 K/UL — SIGNIFICANT CHANGE UP (ref 0–0.5)
EOSINOPHIL NFR BLD AUTO: 0 % — SIGNIFICANT CHANGE UP (ref 0–6)
GLUCOSE SERPL-MCNC: 159 MG/DL — HIGH (ref 70–99)
HCT VFR BLD CALC: 37.5 % — LOW (ref 39–50)
HGB BLD-MCNC: 12.8 G/DL — LOW (ref 13–17)
IMM GRANULOCYTES # BLD AUTO: 0.28 K/UL — HIGH (ref 0–0.07)
IMM GRANULOCYTES NFR BLD AUTO: 2.3 % — HIGH (ref 0–0.9)
LYMPHOCYTES # BLD AUTO: 0.78 K/UL — LOW (ref 1–3.3)
LYMPHOCYTES NFR BLD AUTO: 6.3 % — LOW (ref 13–44)
MAGNESIUM SERPL-MCNC: 2.6 MG/DL — SIGNIFICANT CHANGE UP (ref 1.6–2.6)
MCHC RBC-ENTMCNC: 30 PG — SIGNIFICANT CHANGE UP (ref 27–34)
MCHC RBC-ENTMCNC: 34.1 G/DL — SIGNIFICANT CHANGE UP (ref 32–36)
MCV RBC AUTO: 87.8 FL — SIGNIFICANT CHANGE UP (ref 80–100)
METHOD TYPE: SIGNIFICANT CHANGE UP
MONOCYTES # BLD AUTO: 0.31 K/UL — SIGNIFICANT CHANGE UP (ref 0–0.9)
MONOCYTES NFR BLD AUTO: 2.5 % — SIGNIFICANT CHANGE UP (ref 2–14)
NEUTROPHILS # BLD AUTO: 10.89 K/UL — HIGH (ref 1.8–7.4)
NEUTROPHILS NFR BLD AUTO: 88.7 % — HIGH (ref 43–77)
NRBC # BLD AUTO: 0 K/UL — SIGNIFICANT CHANGE UP (ref 0–0)
NRBC # FLD: 0 K/UL — SIGNIFICANT CHANGE UP (ref 0–0)
NRBC BLD AUTO-RTO: 0 /100 WBCS — SIGNIFICANT CHANGE UP (ref 0–0)
ORGANISM # SPEC MICROSCOPIC CNT: ABNORMAL
ORGANISM # SPEC MICROSCOPIC CNT: ABNORMAL
ORGANISM # SPEC MICROSCOPIC CNT: SIGNIFICANT CHANGE UP
PLATELET # BLD AUTO: 513 K/UL — HIGH (ref 150–400)
PMV BLD: 9.4 FL — SIGNIFICANT CHANGE UP (ref 7–13)
POTASSIUM SERPL-MCNC: 4.4 MMOL/L — SIGNIFICANT CHANGE UP (ref 3.5–5.3)
POTASSIUM SERPL-SCNC: 4.4 MMOL/L — SIGNIFICANT CHANGE UP (ref 3.5–5.3)
RBC # BLD: 4.27 M/UL — SIGNIFICANT CHANGE UP (ref 4.2–5.8)
RBC # FLD: 14.4 % — SIGNIFICANT CHANGE UP (ref 10.3–14.5)
SODIUM SERPL-SCNC: 134 MMOL/L — LOW (ref 135–145)
SPECIMEN SOURCE: SIGNIFICANT CHANGE UP
WBC # BLD: 12.29 K/UL — HIGH (ref 3.8–10.5)
WBC # FLD AUTO: 12.29 K/UL — HIGH (ref 3.8–10.5)

## 2025-02-17 PROCEDURE — 99233 SBSQ HOSP IP/OBS HIGH 50: CPT

## 2025-02-17 PROCEDURE — 93306 TTE W/DOPPLER COMPLETE: CPT | Mod: 26

## 2025-02-17 RX ORDER — CEFTRIAXONE 500 MG/1
2000 INJECTION, POWDER, FOR SOLUTION INTRAMUSCULAR; INTRAVENOUS EVERY 24 HOURS
Refills: 0 | Status: DISCONTINUED | OUTPATIENT
Start: 2025-02-17 | End: 2025-02-19

## 2025-02-17 RX ORDER — DILTIAZEM HYDROCHLORIDE 240 MG/1
60 TABLET, EXTENDED RELEASE ORAL EVERY 6 HOURS
Refills: 0 | Status: DISCONTINUED | OUTPATIENT
Start: 2025-02-17 | End: 2025-02-19

## 2025-02-17 RX ORDER — METHYLPREDNISOLONE ACETATE 80 MG/ML
40 INJECTION, SUSPENSION INTRA-ARTICULAR; INTRALESIONAL; INTRAMUSCULAR; SOFT TISSUE
Refills: 0 | Status: DISCONTINUED | OUTPATIENT
Start: 2025-02-17 | End: 2025-02-19

## 2025-02-17 RX ADMIN — DILTIAZEM HYDROCHLORIDE 60 MILLIGRAM(S): 240 TABLET, EXTENDED RELEASE ORAL at 17:48

## 2025-02-17 RX ADMIN — CEFEPIME 2000 MILLIGRAM(S): 2 INJECTION, POWDER, FOR SOLUTION INTRAVENOUS at 06:14

## 2025-02-17 RX ADMIN — DIGOXIN 250 MICROGRAM(S): 250 TABLET ORAL at 06:12

## 2025-02-17 RX ADMIN — DILTIAZEM HYDROCHLORIDE 60 MILLIGRAM(S): 240 TABLET, EXTENDED RELEASE ORAL at 11:19

## 2025-02-17 RX ADMIN — HEPARIN SODIUM 3300 UNIT(S)/HR: 1000 INJECTION INTRAVENOUS; SUBCUTANEOUS at 07:59

## 2025-02-17 RX ADMIN — METOPROLOL SUCCINATE 50 MILLIGRAM(S): 50 TABLET, EXTENDED RELEASE ORAL at 06:13

## 2025-02-17 RX ADMIN — HEPARIN SODIUM 3300 UNIT(S)/HR: 1000 INJECTION INTRAVENOUS; SUBCUTANEOUS at 20:38

## 2025-02-17 RX ADMIN — DILTIAZEM HYDROCHLORIDE 60 MILLIGRAM(S): 240 TABLET, EXTENDED RELEASE ORAL at 22:21

## 2025-02-17 RX ADMIN — METOPROLOL SUCCINATE 50 MILLIGRAM(S): 50 TABLET, EXTENDED RELEASE ORAL at 17:49

## 2025-02-17 RX ADMIN — Medication 40 MILLIGRAM(S): at 06:12

## 2025-02-17 RX ADMIN — CEFTRIAXONE 2000 MILLIGRAM(S): 500 INJECTION, POWDER, FOR SOLUTION INTRAMUSCULAR; INTRAVENOUS at 17:48

## 2025-02-17 RX ADMIN — METHYLPREDNISOLONE ACETATE 40 MILLIGRAM(S): 80 INJECTION, SUSPENSION INTRA-ARTICULAR; INTRALESIONAL; INTRAMUSCULAR; SOFT TISSUE at 06:14

## 2025-02-17 RX ADMIN — DILTIAZEM HYDROCHLORIDE 30 MILLIGRAM(S): 240 TABLET, EXTENDED RELEASE ORAL at 06:12

## 2025-02-17 RX ADMIN — HEPARIN SODIUM 3300 UNIT(S)/HR: 1000 INJECTION INTRAVENOUS; SUBCUTANEOUS at 17:49

## 2025-02-17 RX ADMIN — METHYLPREDNISOLONE ACETATE 40 MILLIGRAM(S): 80 INJECTION, SUSPENSION INTRA-ARTICULAR; INTRALESIONAL; INTRAMUSCULAR; SOFT TISSUE at 17:49

## 2025-02-17 RX ADMIN — HEPARIN SODIUM 3300 UNIT(S)/HR: 1000 INJECTION INTRAVENOUS; SUBCUTANEOUS at 04:01

## 2025-02-17 NOTE — PROGRESS NOTE ADULT - ASSESSMENT
60 y/o M w/ PMH of pAF (on ASA only given chadsvasc=1), HTN presented w/ worsening sob and nonproductive cough.  Symptoms started a week a go and was being treated w/ doxycycline for PNA (started on 2/10) but symptoms progressed.  Pt was then started on medrol pack (2/12) but symptoms continued to progress and came in to hospital.  VS in ED significant for HRs 150s-170's, RR 20-30, febrile to Tmax 100.6 but BP WNL.  Clinically toxic appearing, unable to speak full sentences, incresed WOB but no use of accessories, overall poor air entry b/l but Rt rhonchi apprciated.  Initial w/u significant for WBC 27.75 w/ L-shift, LA 2.5-->2.6, AGMA, Cr 1.72.  CTA neg for PE but significant for multifocal PNA.  Recievied 4L IVNS, placed on 125cc maintanance NS and given vanco/cefepime in ED.  Cardio consulted.  Will admit for sepsis and upgrade to SDU.       Sepsis 2/2 multifocal PNA   Acute hypoxic respiratory failure 2/2 multifocal pna   - improvement in leukocytosis  - patient endorses improvement in respiratory, still requiring HFNC at 60% FiO2  - c/w steroids  - c/w abx  - Tele/  - BCx positive for strep pneumoniae, repeat bcx      AF RVR likely precipitated by sepsis and hypoxia   - c/w cardizem, BB and Dig  - tele monitoring    Transaminitis   - Benign abd exam and Tbili low normal   - Considered congestive hepatopathy given amount of IVFs given and RVR   - Clinically hypovolemic w/ ongoing losses from fever therefore will c/w IVFs w/ LR but lower rate and only give 1L then reassess   - If LFTs worsen or symptomatic will get RUQ US       HTN  - Hold ARB for now given soft BP and JAGRUTI   - Hold amlodipine for now given soft BP  - c/w metoprolol for rate control w/ parameters for BP       Incidental CT chest finding  - CTA reported mild fusiform aneurysmal dilatation of the aortic root to 4.1 cm and mid ascending thoracic aorta measures approximately 3.7 cm  - f/u outpt w/ CTSx or vascular for continued monitoring and c/w BP control         VTE ppx:  heparin gtt and prior to d/c discuss w/ cardio if long term tx w/ AC warranted      Dispo: Medically active, needs 1-2 days more

## 2025-02-18 LAB
ANION GAP SERPL CALC-SCNC: 11 MMOL/L — SIGNIFICANT CHANGE UP (ref 5–17)
APTT BLD: 169.6 SEC — CRITICAL HIGH (ref 24.5–35.6)
BASOPHILS # BLD AUTO: 0.02 K/UL — SIGNIFICANT CHANGE UP (ref 0–0.2)
BASOPHILS NFR BLD AUTO: 0.3 % — SIGNIFICANT CHANGE UP (ref 0–2)
BUN SERPL-MCNC: 28.6 MG/DL — HIGH (ref 8–20)
CALCIUM SERPL-MCNC: 7.8 MG/DL — LOW (ref 8.4–10.5)
CHLORIDE SERPL-SCNC: 102 MMOL/L — SIGNIFICANT CHANGE UP (ref 96–108)
CO2 SERPL-SCNC: 24 MMOL/L — SIGNIFICANT CHANGE UP (ref 22–29)
CREAT SERPL-MCNC: 0.8 MG/DL — SIGNIFICANT CHANGE UP (ref 0.5–1.3)
EGFR: 101 ML/MIN/1.73M2 — SIGNIFICANT CHANGE UP
EOSINOPHIL # BLD AUTO: 0 K/UL — SIGNIFICANT CHANGE UP (ref 0–0.5)
EOSINOPHIL NFR BLD AUTO: 0 % — SIGNIFICANT CHANGE UP (ref 0–6)
GLUCOSE SERPL-MCNC: 155 MG/DL — HIGH (ref 70–99)
HCT VFR BLD CALC: 38.5 % — LOW (ref 39–50)
HGB BLD-MCNC: 12.8 G/DL — LOW (ref 13–17)
IMM GRANULOCYTES # BLD AUTO: 0.12 K/UL — HIGH (ref 0–0.07)
IMM GRANULOCYTES NFR BLD AUTO: 1.5 % — HIGH (ref 0–0.9)
LYMPHOCYTES # BLD AUTO: 0.63 K/UL — LOW (ref 1–3.3)
LYMPHOCYTES NFR BLD AUTO: 8 % — LOW (ref 13–44)
MCHC RBC-ENTMCNC: 29.5 PG — SIGNIFICANT CHANGE UP (ref 27–34)
MCHC RBC-ENTMCNC: 33.2 G/DL — SIGNIFICANT CHANGE UP (ref 32–36)
MCV RBC AUTO: 88.7 FL — SIGNIFICANT CHANGE UP (ref 80–100)
MONOCYTES # BLD AUTO: 0.41 K/UL — SIGNIFICANT CHANGE UP (ref 0–0.9)
MONOCYTES NFR BLD AUTO: 5.2 % — SIGNIFICANT CHANGE UP (ref 2–14)
NEUTROPHILS # BLD AUTO: 6.73 K/UL — SIGNIFICANT CHANGE UP (ref 1.8–7.4)
NEUTROPHILS NFR BLD AUTO: 85 % — HIGH (ref 43–77)
NRBC # BLD AUTO: 0 K/UL — SIGNIFICANT CHANGE UP (ref 0–0)
NRBC # FLD: 0 K/UL — SIGNIFICANT CHANGE UP (ref 0–0)
NRBC BLD AUTO-RTO: 0 /100 WBCS — SIGNIFICANT CHANGE UP (ref 0–0)
PLATELET # BLD AUTO: 540 K/UL — HIGH (ref 150–400)
PMV BLD: 9.1 FL — SIGNIFICANT CHANGE UP (ref 7–13)
POTASSIUM SERPL-MCNC: 4.4 MMOL/L — SIGNIFICANT CHANGE UP (ref 3.5–5.3)
POTASSIUM SERPL-SCNC: 4.4 MMOL/L — SIGNIFICANT CHANGE UP (ref 3.5–5.3)
RBC # BLD: 4.34 M/UL — SIGNIFICANT CHANGE UP (ref 4.2–5.8)
RBC # FLD: 13.7 % — SIGNIFICANT CHANGE UP (ref 10.3–14.5)
SODIUM SERPL-SCNC: 137 MMOL/L — SIGNIFICANT CHANGE UP (ref 135–145)
WBC # BLD: 7.91 K/UL — SIGNIFICANT CHANGE UP (ref 3.8–10.5)
WBC # FLD AUTO: 7.91 K/UL — SIGNIFICANT CHANGE UP (ref 3.8–10.5)

## 2025-02-18 PROCEDURE — 99233 SBSQ HOSP IP/OBS HIGH 50: CPT

## 2025-02-18 PROCEDURE — G0545: CPT

## 2025-02-18 PROCEDURE — 99223 1ST HOSP IP/OBS HIGH 75: CPT

## 2025-02-18 RX ORDER — APIXABAN 2.5 MG/1
5 TABLET, FILM COATED ORAL
Refills: 0 | Status: DISCONTINUED | OUTPATIENT
Start: 2025-02-18 | End: 2025-02-19

## 2025-02-18 RX ADMIN — DILTIAZEM HYDROCHLORIDE 60 MILLIGRAM(S): 240 TABLET, EXTENDED RELEASE ORAL at 11:38

## 2025-02-18 RX ADMIN — METHYLPREDNISOLONE ACETATE 40 MILLIGRAM(S): 80 INJECTION, SUSPENSION INTRA-ARTICULAR; INTRALESIONAL; INTRAMUSCULAR; SOFT TISSUE at 18:14

## 2025-02-18 RX ADMIN — Medication 40 MILLIGRAM(S): at 06:01

## 2025-02-18 RX ADMIN — Medication 650 MILLIGRAM(S): at 09:09

## 2025-02-18 RX ADMIN — METOPROLOL SUCCINATE 50 MILLIGRAM(S): 50 TABLET, EXTENDED RELEASE ORAL at 18:16

## 2025-02-18 RX ADMIN — METHYLPREDNISOLONE ACETATE 40 MILLIGRAM(S): 80 INJECTION, SUSPENSION INTRA-ARTICULAR; INTRALESIONAL; INTRAMUSCULAR; SOFT TISSUE at 05:59

## 2025-02-18 RX ADMIN — DIGOXIN 250 MICROGRAM(S): 250 TABLET ORAL at 05:59

## 2025-02-18 RX ADMIN — HEPARIN SODIUM 3000 UNIT(S)/HR: 1000 INJECTION INTRAVENOUS; SUBCUTANEOUS at 07:03

## 2025-02-18 RX ADMIN — DILTIAZEM HYDROCHLORIDE 60 MILLIGRAM(S): 240 TABLET, EXTENDED RELEASE ORAL at 05:59

## 2025-02-18 RX ADMIN — HEPARIN SODIUM 3300 UNIT(S)/HR: 1000 INJECTION INTRAVENOUS; SUBCUTANEOUS at 00:51

## 2025-02-18 RX ADMIN — HEPARIN SODIUM 3000 UNIT(S)/HR: 1000 INJECTION INTRAVENOUS; SUBCUTANEOUS at 07:44

## 2025-02-18 RX ADMIN — APIXABAN 5 MILLIGRAM(S): 2.5 TABLET, FILM COATED ORAL at 18:14

## 2025-02-18 RX ADMIN — HEPARIN SODIUM 3000 UNIT(S)/HR: 1000 INJECTION INTRAVENOUS; SUBCUTANEOUS at 11:12

## 2025-02-18 RX ADMIN — METOPROLOL SUCCINATE 50 MILLIGRAM(S): 50 TABLET, EXTENDED RELEASE ORAL at 05:59

## 2025-02-18 RX ADMIN — HEPARIN SODIUM 0 UNIT(S)/HR: 1000 INJECTION INTRAVENOUS; SUBCUTANEOUS at 05:17

## 2025-02-18 RX ADMIN — CEFTRIAXONE 2000 MILLIGRAM(S): 500 INJECTION, POWDER, FOR SOLUTION INTRAMUSCULAR; INTRAVENOUS at 18:14

## 2025-02-18 RX ADMIN — DILTIAZEM HYDROCHLORIDE 60 MILLIGRAM(S): 240 TABLET, EXTENDED RELEASE ORAL at 18:14

## 2025-02-18 NOTE — PROGRESS NOTE ADULT - ASSESSMENT
58 y/o M w/ PMH of pAF (on ASA only given chadsvasc=1), HTN presented w/ worsening sob and nonproductive cough.  Symptoms started a week a go and was being treated w/ doxycycline for PNA (started on 2/10) but symptoms progressed.  Pt was then started on medrol pack (2/12) but symptoms continued to progress and came in to hospital.  VS in ED significant for HRs 150s-170's, RR 20-30, febrile to Tmax 100.6 but BP WNL.  Clinically toxic appearing, unable to speak full sentences, incresed WOB but no use of accessories, overall poor air entry b/l but Rt rhonchi apprciated.  Initial w/u significant for WBC 27.75 w/ L-shift, LA 2.5-->2.6, AGMA, Cr 1.72.  CTA neg for PE but significant for multifocal PNA.  Recievied 4L IVNS, placed on 125cc maintanance NS and given vanco/cefepime in ED.  Cardio consulted.  Will admit for sepsis and upgrade to SDU.       Sepsis 2/2 multifocal PNA   Acute hypoxic respiratory failure 2/2 multifocal pna   - improvement in resp status, still requiring NC, home ox eval  - c/w steroids  - c/w ceftriaxone, to go home w/ home infusion tomorrow  - Tele/  - BCx positive for strep pneumoniae, bcx neg now      #AF RVR likely precipitated by sepsis and hypoxia   - c/w cardizem, BB and Dig  - tele monitoring  - cards signed off    #Transaminitis   - Benign abd exam and Tbili low normal   - Considered congestive hepatopathy given amount of IVFs given and RVR   - Clinically hypovolemic w/ ongoing losses from fever therefore will c/w IVFs w/ LR but lower rate and only give 1L then reassess   - If LFTs worsen or symptomatic will get RUQ US       #HTN  - plan as above      #Incidental CT chest finding  - CTA reported mild fusiform aneurysmal dilatation of the aortic root to 4.1 cm and mid ascending thoracic aorta measures approximately 3.7 cm  - f/u outpt w/ CTSx or vascular for continued monitoring and c/w BP control         VTE ppx:  Switch heparin to eliquis    Dispo: dc tomorrow pending home ox

## 2025-02-18 NOTE — PROVIDER CONTACT NOTE (CRITICAL VALUE NOTIFICATION) - SITUATION
.6
Blood culture collected on 02/14 at 22:55 on the aerobic bottle (positive for Cocci in pairs and chain)

## 2025-02-18 NOTE — PROVIDER CONTACT NOTE (CRITICAL VALUE NOTIFICATION) - ACTION/TREATMENT ORDERED:
stop for 60 mns and decreased by 3 ml
Pt is already on IV antibiotic.   PA made aware, will continue to monitor.

## 2025-02-18 NOTE — CONSULT NOTE ADULT - SUBJECTIVE AND OBJECTIVE BOX
[facility]  INFECTIOUS DISEASES at [place] / [place] / [place]  =======================================================                              Gabe Hopper MD                              Professor Emeritus:  Dr Vinh Bergeron MD            Diplomates American Board of Internal Medicine & Infectious Diseases                                   Tel  [phone] Fax [phone]                                  Hospital Consult line:  [phone]  =======================================================    Patient is a 59 M who presents with a chief complaint of SOB     HPI:  59 M PMH AF, HTN, who presented here with SOB and non productive cough.     He had a flu like viral upper respiratory illness in early January that resolved. In early-mid Feb patient started having SOB and non productive cough that worsened despite outpatient Doxycycline and Medrol Erik. Here pt with fevers, acute hypoxic respiratory failure requiring supplemental O2, and AF with RVR. Labs showed leukocytosis, acute renal failure, lactic acidosis, elevated procalcitonin. Strep Pneumoniae bacteremia. CT Chest showed multifocal pneumonia as well as aortic root aneurysm. Admitted for severe sepsis due to Strep Pneumoniae multifocal pneumonia complicated by bacteremia. On Ceftriaxone IV and Solumedrol IV. ID consulted for recommendations.     REVIEW OF SYSTEMS  Constitutional: Fevers improved   Skin: No rash   Eyes: No discharge	  ENMT: No sore throat   Respiratory: Cough and SOB improved   Cardiovascular: No chest pain  Gastrointestinal: No pain  Genitourinary: No dysuria  Neurological: No AMS     prior hospital charts reviewed [V]  primary team notes reviewed [V]  other consultant notes reviewed [V]    PAST MEDICAL & SURGICAL HISTORY:  Paroxysmal atrial fibrillation    HTN (hypertension)    SOCIAL HISTORY:  Former   Was  during 9/11     FAMILY HISTORY:    Allergies  penicillin (Unknown)    ANTIMICROBIALS:  cefTRIAXone Injectable. 2000 every 24 hours    ANTIMICROBIALS (past 90 days):  MEDICATIONS  (STANDING):    cefepime  Injectable.   2000 milliGRAM(s) IV Push (02-15-25 @ 00:38)    cefepime  Injectable.   2000 milliGRAM(s) IV Push (02-17-25 @ 06:14)   2000 milliGRAM(s) IV Push (02-16-25 @ 21:18)   2000 milliGRAM(s) IV Push (02-16-25 @ 13:01)   2000 milliGRAM(s) IV Push (02-16-25 @ 05:27)   2000 milliGRAM(s) IV Push (02-15-25 @ 21:06)   2000 milliGRAM(s) IV Push (02-15-25 @ 13:13)    cefTRIAXone Injectable.   2000 milliGRAM(s) IV Push (02-17-25 @ 17:48)    vancomycin  IVPB   300 mL/Hr IV Intermittent (02-15-25 @ 21:07)    vancomycin  IVPB.   250 mL/Hr IV Intermittent (02-15-25 @ 00:37)    OTHER MEDS:   MEDICATIONS  (STANDING):  acetaminophen     Tablet .. 650 every 6 hours PRN  aluminum hydroxide/magnesium hydroxide/simethicone Suspension 30 every 4 hours PRN  benzonatate 100 every 8 hours PRN  digoxin     Tablet 250 daily  diltiazem    Tablet 60 every 6 hours  guaifenesin/dextromethorphan Oral Liquid 10 every 4 hours PRN  heparin   Injectable 9000 every 6 hours PRN  heparin   Injectable 4000 every 6 hours PRN  heparin  Infusion.  <Continuous>  influenza   Vaccine 0.5 once  ipratropium    for Nebulization 500 every 6 hours PRN  levalbuterol Inhalation 0.63 every 6 hours PRN  melatonin 3 at bedtime PRN  methylPREDNISolone sodium succinate Injectable 40 two times a day  metoprolol tartrate 50 two times a day  metoprolol tartrate Injectable 5 every 6 hours PRN  ondansetron Injectable 4 every 8 hours PRN  pantoprazole    Tablet 40 before breakfast    VITALS:  Vital Signs Last 24 Hrs  T(F): 98.3 (02-18-25 @ 11:24), Max: 101.7 (02-15-25 @ 02:02)    Vital Signs Last 24 Hrs  HR: 99 (02-18-25 @ 11:24) (81 - 99)  BP: 150/96 (02-18-25 @ 11:24) (136/89 - 158/95)  RR: 20 (02-18-25 @ 11:24)  SpO2: 95% (02-18-25 @ 11:24) (91% - 97%)  Wt(kg): --    EXAM:  General: Patient in no acute distress   HEENT: NCAT, EOMI  CV: S1+S2  Lungs: No acute respiratory distress, CTAB, on NC O2   Abd: Soft, nontender  Ext: No cyanosis  Neuro: Alert and oriented, no focal deficits   Skin: No rash     Labs:                        12.8   7.91  )-----------( 540      ( 18 Feb 2025 04:00 )             38.5     02-18    137  |  102  |  28.6[H]  ----------------------------<  155[H]  4.4   |  24.0  |  0.80    Ca    7.8[L]      18 Feb 2025 04:00  Mg     2.6     02-17    WBC Trend:  WBC Count: 7.91 (02-18-25 @ 04:00)  WBC Count: 12.29 (02-17-25 @ 03:11)  WBC Count: 20.69 (02-16-25 @ 06:03)  WBC Count: 28.92 (02-15-25 @ 15:00)    Auto Neutrophil #: 6.73 K/uL (02-18-25 @ 04:00)  Auto Neutrophil #: 10.89 K/uL (02-17-25 @ 03:11)  Auto Neutrophil #: 18.99 K/uL (02-16-25 @ 06:03)  Auto Neutrophil #: 30.47 K/uL (02-15-25 @ 08:24)  Auto Neutrophil #: 26.54 K/uL (02-14-25 @ 22:55)    Creatine Trend:  Creatinine: 0.80 (02-18)  Creatinine: 0.92 (02-17)  Creatinine: 1.03 (02-16)  Creatinine: 1.17 (02-15)    Liver Biochemical Testing Trend:  Alanine Aminotransferase (ALT/SGPT): 50 *H* (02-16)  Alanine Aminotransferase (ALT/SGPT): 61 *H* (02-15)  Alanine Aminotransferase (ALT/SGPT): 45 *H* (02-14)  Aspartate Aminotransferase (AST/SGOT): 30 (02-16-25 @ 06:03)  Aspartate Aminotransferase (AST/SGOT): 74 (02-15-25 @ 08:24)  Aspartate Aminotransferase (AST/SGOT): 51 (02-14-25 @ 22:55)  Bilirubin Total: 0.4 (02-16)  Bilirubin Total: 0.8 (02-15)  Bilirubin Total: 0.8 (02-14)    Auto Eosinophil %: 0.0 % (02-18-25 @ 04:00)  Auto Eosinophil %: 0.0 % (02-17-25 @ 03:11)  Auto Eosinophil %: 0.0 % (02-16-25 @ 06:03)    Urinalysis Basic - ( 18 Feb 2025 04:00 )    Color: x / Appearance: x / SG: x / pH: x  Gluc: 155 mg/dL / Ketone: x  / Bili: x / Urobili: x   Blood: x / Protein: x / Nitrite: x   Leuk Esterase: x / RBC: x / WBC x   Sq Epi: x / Non Sq Epi: x / Bacteria: x    MICROBIOLOGY:    MRSA PCR Result.: NotDetec (02-16-25 @ 05:38)    Culture - Blood (collected 16 Feb 2025 06:03)  Source: .Blood BLOOD  Preliminary Report:    No growth at 24 hours    Culture - Blood (collected 14 Feb 2025 22:55)  Source: .Blood BLOOD  Final Report:    Growth in aerobic and anaerobic bottles: Streptococcus pneumoniae    Direct identification is available within approximately 3-5    hours either by Blood Panel Multiplexed PCR or Direct    MALDI-TOF. Details: [email or web address]  Organism: Blood Culture PCR  Streptococcus pneumoniae  Organism: Streptococcus pneumoniae    Sensitivities:      Method Type: CARMELO      -  Clindamycin: S <=0.06      -  Erythromycin: S 0.12 Predicts results for azithromycin.      -  Levofloxacin: S 1      -  Tetracycline: R >4      -  Trimethoprim/Sulfamethoxazole: S <=.25/4.75      -  Vancomycin: S 0.5      -  Ceftriaxone (meningitidis): S <=0.25      -  Ceftriaxone (non-meningitidis): S <=0.25      -  Penicillin (meningitidis): S <=0.03      -  Penicillin (non-meningitidis): S <=0.03      -  Penicillin (oral penicillin V): S <=0.03  Organism: Blood Culture PCR    Sensitivities:      Method Type: PCR      -  Streptococcus pneumoniae: Detec    Legionella Antigen, Urine: Negative (02-16 @ 01:26)  Rapid RVP Result: NotDetec (02-15 @ 08:24)    Procalcitonin: 4.36 (02-15)    Troponin T, High Sensitivity Result: 14 (02-14)    Blood Gas Venous - Lactate: 1.70 (02-16 @ 06:03)    RADIOLOGY:  imaging below personally reviewed    < from: CT Angio Chest PE Protocol w/ IV Cont (02.15.25 @ 01:37) >  IMPRESSION:  Large patchy and confluent airspace consolidations in the right upper and   lower lobes consistent with multifocal pneumonia.  Trace right pleural   effusion.    Mediastinal and right hilar lymphadenopathy, probably reactive to acute   infection.    No pulmonaryembolism.    Mild fusiform aneurysmal dilatation of the aortic root to 4.1 cm in   transverse caliber at the sinus of Valsalva.  Ectatic mid ascending   thoracic aorta measures approximately 3.7 cm in transverse caliber.  The   patient may follow-up with thoracic surgery for long-term management.    < end of copied text >

## 2025-02-18 NOTE — CONSULT NOTE ADULT - ASSESSMENT
59 M PMH AF, HTN, who presented here with SOB and non productive cough.     Recent viral upper respiratory illness early January   Former cigarette smoker  Currently smokes occasional cigars   Cough, SOB  Fevers   Acute hypoxic respiratory failure   AF w/ RVR   Leukocytosis, acute renal failure, lactic acidosis -- IMPROVED   Elevated procalcitonin  Strep Pneumoniae bacteremia from multifocal pneumonia   Abnormal CT Chest, showed multifocal pneumonia as well as aortic root aneurysm  Severe sepsis     Suggest;  Continue Ceftriaxone 2g IV Q24h   Solumedrol taper as per Primary Team   Blood cultures from 2/14 were positive for Strep Pneumoniae   Repeat blood cultures from 2/16 are negative to date, follow up final results   TTE 2/17 no vegetations mentioned   Will likely need 2 weeks of Ceftriaxone IV once blood cultures clear (+/- oral Vantin afterwards)   Would avoid Fluoroquinolones given aortic root aneurysm - pt will require outpatient follow up with Thoracic Surgery for monitoring of said aneurysm   I also informed patient that he should get PCV-20 vaccination in near future once his current illness has resolved   Monitor fever curve and WBC count   Monitor kidney and liver function daily while admitted on IV antibiotics     Case discussed with patient at bedside and with Dr Sena.

## 2025-02-19 ENCOUNTER — TRANSCRIPTION ENCOUNTER (OUTPATIENT)
Age: 60
End: 2025-02-19

## 2025-02-19 LAB
ANION GAP SERPL CALC-SCNC: 8 MMOL/L — SIGNIFICANT CHANGE UP (ref 5–17)
BASOPHILS # BLD AUTO: 0.02 K/UL — SIGNIFICANT CHANGE UP (ref 0–0.2)
BASOPHILS NFR BLD AUTO: 0.3 % — SIGNIFICANT CHANGE UP (ref 0–2)
BUN SERPL-MCNC: 27.5 MG/DL — HIGH (ref 8–20)
CALCIUM SERPL-MCNC: 7.8 MG/DL — LOW (ref 8.4–10.5)
CHLORIDE SERPL-SCNC: 101 MMOL/L — SIGNIFICANT CHANGE UP (ref 96–108)
CO2 SERPL-SCNC: 27 MMOL/L — SIGNIFICANT CHANGE UP (ref 22–29)
CREAT SERPL-MCNC: 0.69 MG/DL — SIGNIFICANT CHANGE UP (ref 0.5–1.3)
EGFR: 105 ML/MIN/1.73M2 — SIGNIFICANT CHANGE UP
EOSINOPHIL # BLD AUTO: 0 K/UL — SIGNIFICANT CHANGE UP (ref 0–0.5)
EOSINOPHIL NFR BLD AUTO: 0 % — SIGNIFICANT CHANGE UP (ref 0–6)
GLUCOSE SERPL-MCNC: 137 MG/DL — HIGH (ref 70–99)
HCT VFR BLD CALC: 37.1 % — LOW (ref 39–50)
HGB BLD-MCNC: 12.7 G/DL — LOW (ref 13–17)
IMM GRANULOCYTES # BLD AUTO: 0.14 K/UL — HIGH (ref 0–0.07)
IMM GRANULOCYTES NFR BLD AUTO: 1.8 % — HIGH (ref 0–0.9)
LYMPHOCYTES # BLD AUTO: 0.51 K/UL — LOW (ref 1–3.3)
LYMPHOCYTES NFR BLD AUTO: 6.5 % — LOW (ref 13–44)
M PNEUMO DNA SPEC QL NAA+PROBE: NEGATIVE — SIGNIFICANT CHANGE UP
MCHC RBC-ENTMCNC: 30.2 PG — SIGNIFICANT CHANGE UP (ref 27–34)
MCHC RBC-ENTMCNC: 34.2 G/DL — SIGNIFICANT CHANGE UP (ref 32–36)
MCV RBC AUTO: 88.1 FL — SIGNIFICANT CHANGE UP (ref 80–100)
MONOCYTES # BLD AUTO: 0.45 K/UL — SIGNIFICANT CHANGE UP (ref 0–0.9)
MONOCYTES NFR BLD AUTO: 5.7 % — SIGNIFICANT CHANGE UP (ref 2–14)
NEUTROPHILS # BLD AUTO: 6.75 K/UL — SIGNIFICANT CHANGE UP (ref 1.8–7.4)
NEUTROPHILS NFR BLD AUTO: 85.7 % — HIGH (ref 43–77)
NRBC # BLD AUTO: 0 K/UL — SIGNIFICANT CHANGE UP (ref 0–0)
NRBC # FLD: 0 K/UL — SIGNIFICANT CHANGE UP (ref 0–0)
NRBC BLD AUTO-RTO: 0 /100 WBCS — SIGNIFICANT CHANGE UP (ref 0–0)
PLATELET # BLD AUTO: 513 K/UL — HIGH (ref 150–400)
PMV BLD: 9 FL — SIGNIFICANT CHANGE UP (ref 7–13)
POTASSIUM SERPL-MCNC: 4.8 MMOL/L — SIGNIFICANT CHANGE UP (ref 3.5–5.3)
POTASSIUM SERPL-SCNC: 4.8 MMOL/L — SIGNIFICANT CHANGE UP (ref 3.5–5.3)
RBC # BLD: 4.21 M/UL — SIGNIFICANT CHANGE UP (ref 4.2–5.8)
RBC # FLD: 13.5 % — SIGNIFICANT CHANGE UP (ref 10.3–14.5)
S PNEUM AG UR QL: NEGATIVE — SIGNIFICANT CHANGE UP
SODIUM SERPL-SCNC: 136 MMOL/L — SIGNIFICANT CHANGE UP (ref 135–145)
SPECIMEN SOURCE: SIGNIFICANT CHANGE UP
WBC # BLD: 7.87 K/UL — SIGNIFICANT CHANGE UP (ref 3.8–10.5)
WBC # FLD AUTO: 7.87 K/UL — SIGNIFICANT CHANGE UP (ref 3.8–10.5)

## 2025-02-19 PROCEDURE — 99285 EMERGENCY DEPT VISIT HI MDM: CPT

## 2025-02-19 PROCEDURE — 87637 SARSCOV2&INF A&B&RSV AMP PRB: CPT

## 2025-02-19 PROCEDURE — 87640 STAPH A DNA AMP PROBE: CPT

## 2025-02-19 PROCEDURE — 87150 DNA/RNA AMPLIFIED PROBE: CPT

## 2025-02-19 PROCEDURE — 85027 COMPLETE CBC AUTOMATED: CPT

## 2025-02-19 PROCEDURE — 85610 PROTHROMBIN TIME: CPT

## 2025-02-19 PROCEDURE — 83735 ASSAY OF MAGNESIUM: CPT

## 2025-02-19 PROCEDURE — 36556 INSERT NON-TUNNEL CV CATH: CPT

## 2025-02-19 PROCEDURE — 76937 US GUIDE VASCULAR ACCESS: CPT | Mod: 26

## 2025-02-19 PROCEDURE — 76942 ECHO GUIDE FOR BIOPSY: CPT | Mod: 26,59

## 2025-02-19 PROCEDURE — 93005 ELECTROCARDIOGRAM TRACING: CPT

## 2025-02-19 PROCEDURE — 83605 ASSAY OF LACTIC ACID: CPT

## 2025-02-19 PROCEDURE — 99233 SBSQ HOSP IP/OBS HIGH 50: CPT

## 2025-02-19 PROCEDURE — 85025 COMPLETE CBC W/AUTO DIFF WBC: CPT

## 2025-02-19 PROCEDURE — 80048 BASIC METABOLIC PNL TOTAL CA: CPT

## 2025-02-19 PROCEDURE — 87040 BLOOD CULTURE FOR BACTERIA: CPT

## 2025-02-19 PROCEDURE — 84132 ASSAY OF SERUM POTASSIUM: CPT

## 2025-02-19 PROCEDURE — 87641 MR-STAPH DNA AMP PROBE: CPT

## 2025-02-19 PROCEDURE — 82330 ASSAY OF CALCIUM: CPT

## 2025-02-19 PROCEDURE — 94760 N-INVAS EAR/PLS OXIMETRY 1: CPT

## 2025-02-19 PROCEDURE — 84145 PROCALCITONIN (PCT): CPT

## 2025-02-19 PROCEDURE — 87899 AGENT NOS ASSAY W/OPTIC: CPT

## 2025-02-19 PROCEDURE — 82947 ASSAY GLUCOSE BLOOD QUANT: CPT

## 2025-02-19 PROCEDURE — 71275 CT ANGIOGRAPHY CHEST: CPT | Mod: MC

## 2025-02-19 PROCEDURE — 93306 TTE W/DOPPLER COMPLETE: CPT

## 2025-02-19 PROCEDURE — 87077 CULTURE AEROBIC IDENTIFY: CPT

## 2025-02-19 PROCEDURE — 99239 HOSP IP/OBS DSCHRG MGMT >30: CPT

## 2025-02-19 PROCEDURE — 80053 COMPREHEN METABOLIC PANEL: CPT

## 2025-02-19 PROCEDURE — 84443 ASSAY THYROID STIM HORMONE: CPT

## 2025-02-19 PROCEDURE — 96365 THER/PROPH/DIAG IV INF INIT: CPT

## 2025-02-19 PROCEDURE — 85014 HEMATOCRIT: CPT

## 2025-02-19 PROCEDURE — 96367 TX/PROPH/DG ADDL SEQ IV INF: CPT

## 2025-02-19 PROCEDURE — 85018 HEMOGLOBIN: CPT

## 2025-02-19 PROCEDURE — 96375 TX/PRO/DX INJ NEW DRUG ADDON: CPT

## 2025-02-19 PROCEDURE — 87186 SC STD MICRODIL/AGAR DIL: CPT

## 2025-02-19 PROCEDURE — 0225U NFCT DS DNA&RNA 21 SARSCOV2: CPT

## 2025-02-19 PROCEDURE — 84295 ASSAY OF SERUM SODIUM: CPT

## 2025-02-19 PROCEDURE — 36415 COLL VENOUS BLD VENIPUNCTURE: CPT

## 2025-02-19 PROCEDURE — 71045 X-RAY EXAM CHEST 1 VIEW: CPT

## 2025-02-19 PROCEDURE — 80162 ASSAY OF DIGOXIN TOTAL: CPT

## 2025-02-19 PROCEDURE — 82435 ASSAY OF BLOOD CHLORIDE: CPT

## 2025-02-19 PROCEDURE — 82803 BLOOD GASES ANY COMBINATION: CPT

## 2025-02-19 PROCEDURE — 85730 THROMBOPLASTIN TIME PARTIAL: CPT

## 2025-02-19 PROCEDURE — 81001 URINALYSIS AUTO W/SCOPE: CPT

## 2025-02-19 PROCEDURE — 96376 TX/PRO/DX INJ SAME DRUG ADON: CPT

## 2025-02-19 PROCEDURE — G0545: CPT

## 2025-02-19 PROCEDURE — 84484 ASSAY OF TROPONIN QUANT: CPT

## 2025-02-19 PROCEDURE — 94660 CPAP INITIATION&MGMT: CPT

## 2025-02-19 PROCEDURE — 87449 NOS EACH ORGANISM AG IA: CPT

## 2025-02-19 PROCEDURE — 84100 ASSAY OF PHOSPHORUS: CPT

## 2025-02-19 PROCEDURE — 87581 M.PNEUMON DNA AMP PROBE: CPT

## 2025-02-19 RX ORDER — AMLODIPINE BESYLATE 10 MG/1
1 TABLET ORAL
Refills: 0 | DISCHARGE

## 2025-02-19 RX ORDER — CEFTRIAXONE 500 MG/1
2 INJECTION, POWDER, FOR SOLUTION INTRAMUSCULAR; INTRAVENOUS
Qty: 28 | Refills: 0
Start: 2025-02-19

## 2025-02-19 RX ORDER — DILTIAZEM HYDROCHLORIDE 240 MG/1
1 TABLET, EXTENDED RELEASE ORAL
Qty: 30 | Refills: 0
Start: 2025-02-19 | End: 2025-03-20

## 2025-02-19 RX ORDER — METOPROLOL SUCCINATE 50 MG/1
1 TABLET, EXTENDED RELEASE ORAL
Refills: 0 | DISCHARGE

## 2025-02-19 RX ORDER — APIXABAN 2.5 MG/1
1 TABLET, FILM COATED ORAL
Qty: 60 | Refills: 0
Start: 2025-02-19 | End: 2025-03-20

## 2025-02-19 RX ORDER — LOSARTAN POTASSIUM 100 MG/1
1 TABLET, FILM COATED ORAL
Refills: 0 | DISCHARGE

## 2025-02-19 RX ORDER — DIGOXIN 250 UG/1
1 TABLET ORAL
Qty: 30 | Refills: 0
Start: 2025-02-19 | End: 2025-03-20

## 2025-02-19 RX ORDER — LEVALBUTEROL HYDROCHLORIDE 1.25 MG/3ML
3 SOLUTION RESPIRATORY (INHALATION)
Qty: 360 | Refills: 0
Start: 2025-02-19 | End: 2025-03-20

## 2025-02-19 RX ORDER — METOPROLOL SUCCINATE 50 MG/1
1 TABLET, EXTENDED RELEASE ORAL
Qty: 30 | Refills: 0
Start: 2025-02-19 | End: 2025-03-20

## 2025-02-19 RX ORDER — PREDNISONE 20 MG/1
4 TABLET ORAL
Qty: 32 | Refills: 0
Start: 2025-02-19 | End: 2025-02-26

## 2025-02-19 RX ORDER — BENZONATATE 100 MG
1 CAPSULE ORAL
Qty: 42 | Refills: 0
Start: 2025-02-19 | End: 2025-03-04

## 2025-02-19 RX ADMIN — Medication 40 MILLIGRAM(S): at 11:40

## 2025-02-19 RX ADMIN — DILTIAZEM HYDROCHLORIDE 60 MILLIGRAM(S): 240 TABLET, EXTENDED RELEASE ORAL at 05:17

## 2025-02-19 RX ADMIN — DILTIAZEM HYDROCHLORIDE 60 MILLIGRAM(S): 240 TABLET, EXTENDED RELEASE ORAL at 11:37

## 2025-02-19 RX ADMIN — DIGOXIN 250 MICROGRAM(S): 250 TABLET ORAL at 05:16

## 2025-02-19 RX ADMIN — DILTIAZEM HYDROCHLORIDE 60 MILLIGRAM(S): 240 TABLET, EXTENDED RELEASE ORAL at 17:05

## 2025-02-19 RX ADMIN — APIXABAN 5 MILLIGRAM(S): 2.5 TABLET, FILM COATED ORAL at 17:04

## 2025-02-19 RX ADMIN — DILTIAZEM HYDROCHLORIDE 60 MILLIGRAM(S): 240 TABLET, EXTENDED RELEASE ORAL at 00:24

## 2025-02-19 RX ADMIN — METOPROLOL SUCCINATE 50 MILLIGRAM(S): 50 TABLET, EXTENDED RELEASE ORAL at 17:05

## 2025-02-19 RX ADMIN — APIXABAN 5 MILLIGRAM(S): 2.5 TABLET, FILM COATED ORAL at 05:17

## 2025-02-19 RX ADMIN — CEFTRIAXONE 2000 MILLIGRAM(S): 500 INJECTION, POWDER, FOR SOLUTION INTRAMUSCULAR; INTRAVENOUS at 17:05

## 2025-02-19 RX ADMIN — METHYLPREDNISOLONE ACETATE 40 MILLIGRAM(S): 80 INJECTION, SUSPENSION INTRA-ARTICULAR; INTRALESIONAL; INTRAMUSCULAR; SOFT TISSUE at 05:17

## 2025-02-19 RX ADMIN — METOPROLOL SUCCINATE 50 MILLIGRAM(S): 50 TABLET, EXTENDED RELEASE ORAL at 05:16

## 2025-02-19 NOTE — DISCHARGE NOTE PROVIDER - PROVIDER TOKENS
PROVIDER:[TOKEN:[[id number]],FOLLOWUP:[2 weeks]],PROVIDER:[TOKEN:[[id number]],FOLLOWUP:[2 weeks]]

## 2025-02-19 NOTE — PROGRESS NOTE ADULT - TIME BILLING
Chart/notes review, interpretation of laboratory and imaging results, patient assessment, documentation  Case discussion with patient at bedside, and with Dr Sena  Script give to    Review of microbiological data with Clinical Pharmacist and adjustment of antibiotics where necessary Chart/notes review, interpretation of laboratory and imaging results, patient assessment, documentation  Case discussion with patient at bedside, and with Dr Sena  Script given to    Review of microbiological data with Clinical Pharmacist and adjustment of antibiotics where necessary

## 2025-02-19 NOTE — DISCHARGE NOTE NURSING/CASE MANAGEMENT/SOCIAL WORK - NSDCPEFALRISK_GEN_ALL_CORE
For information on Fall & Injury Prevention, visit: [email or web address] OR  [email or web address] OR  https://www.cdc.gov/steadi/patient.html

## 2025-02-19 NOTE — DISCHARGE NOTE PROVIDER - ATTENDING DISCHARGE PHYSICAL EXAMINATION:
General: Appears comfortable, alert and cooperative.  HEENT: Normocephalic, atraumatic.  Eyes: Pupils reactive, cornea wnl.  Neck: Supple, no nodes adenopathy; no JVD, carotid bruit or thyromegaly.  CARDIOVASCULAR: Tachycardic, irr irr  LUNGS: Decreased BS at bases  ABDOMEN: Soft, nontender without mass or organomegaly. bowel sounds normoactive.  EXTREMITIES: No clubbing, cyanosis or edema. Distal pulses wnl.   SKIN: Warm and dry with normal turgor.  NEURO: Alert/oriented x 3/normal motor exam  PSYCH: Appropriate affect

## 2025-02-19 NOTE — DISCHARGE NOTE PROVIDER - HOSPITAL COURSE
58 y/o M w/ PMH of pAF (on ASA only given chadsvasc=1), HTN presented w/ worsening sob and nonproductive cough.  Symptoms started a week a go and was being treated w/ doxycycline for PNA (started on 2/10) but symptoms progressed.  Pt was then started on medrol pack (2/12) but symptoms continued to progress and came in to hospital.  VS in ED significant for HRs 150s-170's, RR 20-30, febrile to Tmax 100.6 but BP WNL.  Clinically toxic appearing, unable to speak full sentences, incresed WOB but no use of accessories, overall poor air entry b/l but Rt rhonchi apprciated.  Initial w/u significant for WBC 27.75 w/ L-shift, LA 2.5-->2.6, AGMA, Cr 1.72.  CTA neg for PE but significant for multifocal PNA.  Recievied 4L IVNS, placed on 125cc maintanance NS and given vanco/cefepime in ED.  Cardio consulted. started on HFNC, steroids and abx. Patient clinically improved, found to have strep pneumoniae bacteremia. Deemed fit to DC home w/ outpatient IV abx at home and home care.

## 2025-02-19 NOTE — PROCEDURE NOTE - ADDITIONAL PROCEDURE DETAILS
4FR 15CM 31CIRC BARD POWER MIDLINE good flash ns flush left brachial vein by ultrasound guidance.  Patient tolerated well.

## 2025-02-19 NOTE — DISCHARGE NOTE NURSING/CASE MANAGEMENT/SOCIAL WORK - FINANCIAL ASSISTANCE
[facility] provides services at a reduced cost to those who are determined to be eligible through [facility]’s financial assistance program. Information regarding [facility]’s financial assistance program can be found by going to [email or web address] or by calling [phone].

## 2025-02-19 NOTE — PROGRESS NOTE ADULT - SUBJECTIVE AND OBJECTIVE BOX
[place] [facility] - [facility], THE [facility]                                   [address] [place] [address]                                                      PHONE: [phone]                                                         FAX: [phone]  [email or web address]  ---------------------------------------------------------------------------------------------------------------------------------    Overnight events/patient complaints: Patient without any cardiac complaints this morning.      penicillin (Unknown)    MEDICATIONS  (STANDING):  cefepime  Injectable.      cefepime  Injectable. 2000 milliGRAM(s) IV Push every 8 hours  digoxin     Tablet 250 MICROGram(s) Oral daily  diltiazem    Tablet 30 milliGRAM(s) Oral every 6 hours  heparin  Infusion.  Unit(s)/Hr (19 mL/Hr) IV Continuous <Continuous>  influenza   Vaccine 0.5 milliLiter(s) IntraMuscular once  methylPREDNISolone sodium succinate Injectable 40 milliGRAM(s) IV Push every 8 hours  metoprolol tartrate 50 milliGRAM(s) Oral two times a day  pantoprazole    Tablet 40 milliGRAM(s) Oral before breakfast    MEDICATIONS  (PRN):  acetaminophen     Tablet .. 650 milliGRAM(s) Oral every 6 hours PRN Temp greater or equal to 38C (100.4F), Mild Pain (1 - 3)  aluminum hydroxide/magnesium hydroxide/simethicone Suspension 30 milliLiter(s) Oral every 4 hours PRN Dyspepsia  benzonatate 100 milliGRAM(s) Oral every 8 hours PRN Cough  guaifenesin/dextromethorphan Oral Liquid 10 milliLiter(s) Oral every 4 hours PRN Cough  heparin   Injectable 9000 Unit(s) IV Push every 6 hours PRN For aPTT less than 40  heparin   Injectable 4000 Unit(s) IV Push every 6 hours PRN For aPTT between 40 - 57  ipratropium    for Nebulization 500 MICROGram(s) Nebulizer every 6 hours PRN Shortness of Breath and/or Wheezing  levalbuterol Inhalation 0.63 milliGRAM(s) Inhalation every 6 hours PRN SOB/Wheezing  melatonin 3 milliGRAM(s) Oral at bedtime PRN Insomnia  metoprolol tartrate Injectable 5 milliGRAM(s) IV Push every 6 hours PRN for HR sustained >130  ondansetron Injectable 4 milliGRAM(s) IV Push every 8 hours PRN Nausea and/or Vomiting      Vital Signs Last 24 Hrs  T(C): 36.8 (17 Feb 2025 07:29), Max: 37.1 (16 Feb 2025 18:58)  T(F): 98.2 (17 Feb 2025 07:29), Max: 98.8 (16 Feb 2025 18:58)  HR: 97 (17 Feb 2025 07:29) (97 - 127)  BP: 142/84 (17 Feb 2025 07:29) (119/83 - 161/90)  BP(mean): 113 (16 Feb 2025 11:17) (113 - 113)  RR: 18 (17 Feb 2025 07:29) (16 - 20)  SpO2: 95% (17 Feb 2025 08:22) (92% - 98%)    Parameters below as of 17 Feb 2025 08:22  Patient On (Oxygen Delivery Method): nasal cannula, high flow      ICU Vital Signs Last 24 Hrs  DENISE HARRIS  I&O's Detail    Drug Dosing Weight  DENISE DAVIDFFY      PHYSICAL EXAM:  General: NAD  HEENT: Head; normocephalic, atraumatic.  Eyes: EOMI, conjunctiva normal  Neck: Supple  CARDIOVASCULAR: Irregularly irregular rhythm, regular rate, S1 S2, No murmurs or gallops  LUNGS: Coarse breath sounds b/l, coughing  ABDOMEN: Soft, nontende  SKIN: warm and dry  NEURO: Alert/oriented x 3  PSYCH: normal affect.        LABS:                        12.8   12.29 )-----------( 513      ( 17 Feb 2025 03:11 )             37.5     02-17    134[L]  |  101  |  36.9[H]  ----------------------------<  159[H]  4.4   |  22.0  |  0.92    Ca    7.8[L]      17 Feb 2025 03:11  Phos  3.0     02-16  Mg     2.6     02-17    TPro  6.0[L]  /  Alb  2.2[L]  /  TBili  0.4  /  DBili  x   /  AST  30  /  ALT  50[H]  /  AlkPhos  145[H]  02-16    DENISE HARRIS      PTT - ( 17 Feb 2025 03:11 )  PTT:76.2 sec  Urinalysis Basic - ( 17 Feb 2025 03:11 )    Color: x / Appearance: x / SG: x / pH: x  Gluc: 159 mg/dL / Ketone: x  / Bili: x / Urobili: x   Blood: x / Protein: x / Nitrite: x   Leuk Esterase: x / RBC: x / WBC x   Sq Epi: x / Non Sq Epi: x / Bacteria: x        RADIOLOGY & ADDITIONAL STUDIES:    INTERPRETATION OF TELEMETRY (personally reviewed): Afib, 90's at rest, 130-140s upon exertion      ASSESSMENT AND PLAN:  59y Male with a history of paroxysmal atrial fibrillation, hypertension, obesity who presented with borderline hypotension, fever, and rapid atrial fibrillation in setting of multifocal pneumonia and strept pneumo bacteremia.    Afib/Pneumonia/Bacteremia  - Rate controlled at rest however rapid under exertion  - Continue digoxin 0.25 mg PO daily  - Continue metoprolol 50 mg bid  - Recommend increasing diltiazem to 60mg q6h  - Continue heparin gtt  - Continue supp oxygen, abx, and supportive care  - F/u echo    Thank you for letting [facility] to assist in the management of this patient. Please call with any questions.
Hospitalist Progress Note    Chief Complaint:  acute hypoxic respiratory failure    SUBJECTIVE / OVERNIGHT EVENTS:  No events overnight, patient seen at bedside, in NAD, no new complaints today. Patient denies abd pain, N/V, fever, chills, dysuria or any other complaints. All remainder ROS negative.     MEDICATIONS  (STANDING):  cefTRIAXone Injectable. 2000 milliGRAM(s) IV Push every 24 hours  digoxin     Tablet 250 MICROGram(s) Oral daily  diltiazem    Tablet 60 milliGRAM(s) Oral every 6 hours  heparin  Infusion.  Unit(s)/Hr (19 mL/Hr) IV Continuous <Continuous>  influenza   Vaccine 0.5 milliLiter(s) IntraMuscular once  methylPREDNISolone sodium succinate Injectable 40 milliGRAM(s) IV Push two times a day  metoprolol tartrate 50 milliGRAM(s) Oral two times a day  pantoprazole    Tablet 40 milliGRAM(s) Oral before breakfast    MEDICATIONS  (PRN):  acetaminophen     Tablet .. 650 milliGRAM(s) Oral every 6 hours PRN Temp greater or equal to 38C (100.4F), Mild Pain (1 - 3)  aluminum hydroxide/magnesium hydroxide/simethicone Suspension 30 milliLiter(s) Oral every 4 hours PRN Dyspepsia  benzonatate 100 milliGRAM(s) Oral every 8 hours PRN Cough  guaifenesin/dextromethorphan Oral Liquid 10 milliLiter(s) Oral every 4 hours PRN Cough  heparin   Injectable 9000 Unit(s) IV Push every 6 hours PRN For aPTT less than 40  heparin   Injectable 4000 Unit(s) IV Push every 6 hours PRN For aPTT between 40 - 57  ipratropium    for Nebulization 500 MICROGram(s) Nebulizer every 6 hours PRN Shortness of Breath and/or Wheezing  levalbuterol Inhalation 0.63 milliGRAM(s) Inhalation every 6 hours PRN SOB/Wheezing  melatonin 3 milliGRAM(s) Oral at bedtime PRN Insomnia  metoprolol tartrate Injectable 5 milliGRAM(s) IV Push every 6 hours PRN for HR sustained >130  ondansetron Injectable 4 milliGRAM(s) IV Push every 8 hours PRN Nausea and/or Vomiting        I&O's Summary      PHYSICAL EXAM:  Vital Signs Last 24 Hrs  T(C): 36.8 (18 Feb 2025 11:24), Max: 37 (18 Feb 2025 07:59)  T(F): 98.3 (18 Feb 2025 11:24), Max: 98.6 (18 Feb 2025 07:59)  HR: 99 (18 Feb 2025 11:24) (81 - 99)  BP: 150/96 (18 Feb 2025 11:24) (136/89 - 158/95)  BP(mean): --  RR: 20 (18 Feb 2025 11:24) (18 - 20)  SpO2: 95% (18 Feb 2025 11:24) (91% - 97%)    Parameters below as of 18 Feb 2025 11:24  Patient On (Oxygen Delivery Method): nasal cannula        General: Appears comfortable, alert and cooperative.  HEENT: Normocephalic, atraumatic.  Eyes: Pupils reactive, cornea wnl.  Neck: Supple, no nodes adenopathy; no JVD, carotid bruit or thyromegaly.  CARDIOVASCULAR: Tachycardic, irr irr  LUNGS: Decreased BS at bases  ABDOMEN: Soft, nontender without mass or organomegaly. bowel sounds normoactive.  EXTREMITIES: No clubbing, cyanosis or edema. Distal pulses wnl.   SKIN: Warm and dry with normal turgor.  NEURO: Alert/oriented x 3/normal motor exam  PSYCH: Appropriate affect    LABS:                        12.8   7.91  )-----------( 540      ( 18 Feb 2025 04:00 )             38.5     02-18    137  |  102  |  28.6[H]  ----------------------------<  155[H]  4.4   |  24.0  |  0.80    Ca    7.8[L]      18 Feb 2025 04:00  Mg     2.6     02-17      PTT - ( 18 Feb 2025 04:00 )  PTT:169.6 sec      Urinalysis Basic - ( 18 Feb 2025 04:00 )    Color: x / Appearance: x / SG: x / pH: x  Gluc: 155 mg/dL / Ketone: x  / Bili: x / Urobili: x   Blood: x / Protein: x / Nitrite: x   Leuk Esterase: x / RBC: x / WBC x   Sq Epi: x / Non Sq Epi: x / Bacteria: x        Culture - Blood (collected 16 Feb 2025 06:03)  Source: .Blood BLOOD  Preliminary Report (17 Feb 2025 13:01):    No growth at 24 hours      CAPILLARY BLOOD GLUCOSE            RADIOLOGY & ADDITIONAL TESTS:  Results Reviewed: Y  Imaging Personally Reviewed: N  Electrocardiogram Personally Reviewed: RUBI                                          
Hospitalist Progress Note    Chief Complaint:  acute hypoxic respiratory failure    SUBJECTIVE / OVERNIGHT EVENTS:  No events overnight, patient seen at bedside, in NAD, no new complaints today. Patient denies abd pain, N/V, fever, chills, dysuria or any other complaints. All remainder ROS negative.     MEDICATIONS  (STANDING):  cefepime  Injectable.      cefepime  Injectable. 2000 milliGRAM(s) IV Push every 8 hours  digoxin     Tablet 250 MICROGram(s) Oral daily  diltiazem    Tablet 60 milliGRAM(s) Oral every 6 hours  heparin  Infusion.  Unit(s)/Hr (19 mL/Hr) IV Continuous <Continuous>  influenza   Vaccine 0.5 milliLiter(s) IntraMuscular once  methylPREDNISolone sodium succinate Injectable 40 milliGRAM(s) IV Push two times a day  metoprolol tartrate 50 milliGRAM(s) Oral two times a day  pantoprazole    Tablet 40 milliGRAM(s) Oral before breakfast    MEDICATIONS  (PRN):  acetaminophen     Tablet .. 650 milliGRAM(s) Oral every 6 hours PRN Temp greater or equal to 38C (100.4F), Mild Pain (1 - 3)  aluminum hydroxide/magnesium hydroxide/simethicone Suspension 30 milliLiter(s) Oral every 4 hours PRN Dyspepsia  benzonatate 100 milliGRAM(s) Oral every 8 hours PRN Cough  guaifenesin/dextromethorphan Oral Liquid 10 milliLiter(s) Oral every 4 hours PRN Cough  heparin   Injectable 9000 Unit(s) IV Push every 6 hours PRN For aPTT less than 40  heparin   Injectable 4000 Unit(s) IV Push every 6 hours PRN For aPTT between 40 - 57  ipratropium    for Nebulization 500 MICROGram(s) Nebulizer every 6 hours PRN Shortness of Breath and/or Wheezing  levalbuterol Inhalation 0.63 milliGRAM(s) Inhalation every 6 hours PRN SOB/Wheezing  melatonin 3 milliGRAM(s) Oral at bedtime PRN Insomnia  metoprolol tartrate Injectable 5 milliGRAM(s) IV Push every 6 hours PRN for HR sustained >130  ondansetron Injectable 4 milliGRAM(s) IV Push every 8 hours PRN Nausea and/or Vomiting        I&O's Summary      PHYSICAL EXAM:  Vital Signs Last 24 Hrs  T(C): 36.6 (17 Feb 2025 10:44), Max: 37.1 (16 Feb 2025 18:58)  T(F): 97.9 (17 Feb 2025 10:44), Max: 98.8 (16 Feb 2025 18:58)  HR: 92 (17 Feb 2025 10:44) (92 - 127)  BP: 141/93 (17 Feb 2025 10:44) (119/83 - 161/90)  BP(mean): --  RR: 18 (17 Feb 2025 10:44) (16 - 19)  SpO2: 92% (17 Feb 2025 10:44) (92% - 98%)    Parameters below as of 17 Feb 2025 10:44  Patient On (Oxygen Delivery Method): nasal cannula, high flow        General: Appears comfortable, alert and cooperative.  HEENT: Normocephalic, atraumatic.  Eyes: Pupils reactive, cornea wnl.  Neck: Supple, no nodes adenopathy; no JVD, carotid bruit or thyromegaly.  CARDIOVASCULAR: Tachycardic, irr irr  LUNGS: Decreased BS at bases  ABDOMEN: Soft, nontender without mass or organomegaly. bowel sounds normoactive.  EXTREMITIES: No clubbing, cyanosis or edema. Distal pulses wnl.   SKIN: Warm and dry with normal turgor.  NEURO: Alert/oriented x 3/normal motor exam  PSYCH: Appropriate affect    LABS:                        12.8   12.29 )-----------( 513      ( 17 Feb 2025 03:11 )             37.5     02-17    134[L]  |  101  |  36.9[H]  ----------------------------<  159[H]  4.4   |  22.0  |  0.92    Ca    7.8[L]      17 Feb 2025 03:11  Phos  3.0     02-16  Mg     2.6     02-17    TPro  6.0[L]  /  Alb  2.2[L]  /  TBili  0.4  /  DBili  x   /  AST  30  /  ALT  50[H]  /  AlkPhos  145[H]  02-16    PTT - ( 17 Feb 2025 03:11 )  PTT:76.2 sec      Urinalysis Basic - ( 17 Feb 2025 03:11 )    Color: x / Appearance: x / SG: x / pH: x  Gluc: 159 mg/dL / Ketone: x  / Bili: x / Urobili: x   Blood: x / Protein: x / Nitrite: x   Leuk Esterase: x / RBC: x / WBC x   Sq Epi: x / Non Sq Epi: x / Bacteria: x        Culture - Blood (collected 14 Feb 2025 22:55)  Source: .Blood BLOOD  Gram Stain (16 Feb 2025 15:43):    Growth in aerobic bottle: Gram Positive Cocci in Pairs and Chains    Growth in anaerobic bottle: Gram Positive Cocci in Pairs and Chains  Preliminary Report (16 Feb 2025 15:43):    Growth in aerobic bottle: Streptococcus pneumoniae    Direct identification is available within approximately 3-5    hours either by Blood Panel Multiplexed PCR or Direct    MALDI-TOF. Details: [email or web address]    Growth in anaerobic bottle: Gram Positive Cocci in Pairs and Chains  Organism: Blood Culture PCR  Streptococcus pneumoniae (17 Feb 2025 10:35)  Organism: Streptococcus pneumoniae (17 Feb 2025 10:35)  Organism: Blood Culture PCR (15 Feb 2025 18:11)      CAPILLARY BLOOD GLUCOSE            RADIOLOGY & ADDITIONAL TESTS:  Results Reviewed: Y  Imaging Personally Reviewed: N  Electrocardiogram Personally Reviewed: RUBI                                          
                [place] [facility], [facility]                                   [address] [place] [address]                                                      PHONE: [phone]                                                         FAX: [phone]  [email or web address]  ---------------------------------------------------------------------------------------------------------------------------------    Overnight events/patient complaints:  Feeling better. Remains on HFNC 60%. HR improving    penicillin (Unknown)    MEDICATIONS  (STANDING):  cefepime  Injectable.      cefepime  Injectable. 2000 milliGRAM(s) IV Push every 8 hours  digoxin     Tablet 250 MICROGram(s) Oral daily  heparin  Infusion.  Unit(s)/Hr (19 mL/Hr) IV Continuous <Continuous>  influenza   Vaccine 0.5 milliLiter(s) IntraMuscular once  methylPREDNISolone sodium succinate Injectable 40 milliGRAM(s) IV Push every 8 hours  metoprolol tartrate 50 milliGRAM(s) Oral two times a day  pantoprazole    Tablet 40 milliGRAM(s) Oral before breakfast  vancomycin  IVPB 1500 milliGRAM(s) IV Intermittent <User Schedule>    MEDICATIONS  (PRN):  acetaminophen     Tablet .. 650 milliGRAM(s) Oral every 6 hours PRN Temp greater or equal to 38C (100.4F), Mild Pain (1 - 3)  aluminum hydroxide/magnesium hydroxide/simethicone Suspension 30 milliLiter(s) Oral every 4 hours PRN Dyspepsia  benzonatate 100 milliGRAM(s) Oral every 8 hours PRN Cough  guaifenesin/dextromethorphan Oral Liquid 10 milliLiter(s) Oral every 4 hours PRN Cough  heparin   Injectable 9000 Unit(s) IV Push every 6 hours PRN For aPTT less than 40  heparin   Injectable 4000 Unit(s) IV Push every 6 hours PRN For aPTT between 40 - 57  ipratropium    for Nebulization 500 MICROGram(s) Nebulizer every 6 hours PRN Shortness of Breath and/or Wheezing  levalbuterol Inhalation 0.63 milliGRAM(s) Inhalation every 6 hours PRN SOB/Wheezing  melatonin 3 milliGRAM(s) Oral at bedtime PRN Insomnia  metoprolol tartrate Injectable 5 milliGRAM(s) IV Push every 6 hours PRN for HR sustained >130  ondansetron Injectable 4 milliGRAM(s) IV Push every 8 hours PRN Nausea and/or Vomiting      Vital Signs Last 24 Hrs  T(C): 36.9 (16 Feb 2025 07:26), Max: 37.6 (15 Feb 2025 13:15)  T(F): 98.5 (16 Feb 2025 07:26), Max: 99.7 (15 Feb 2025 13:15)  HR: 107 (16 Feb 2025 07:26) (97 - 154)  BP: 131/84 (16 Feb 2025 07:26) (113/84 - 140/87)  BP(mean): 100 (16 Feb 2025 07:26) (99 - 107)  RR: 18 (16 Feb 2025 07:26) (16 - 22)  SpO2: 94% (16 Feb 2025 08:44) (94% - 100%)    Parameters below as of 16 Feb 2025 08:44  Patient On (Oxygen Delivery Method): nasal cannula, high flow      ICU Vital Signs Last 24 Hrs  DENISE HARRIS  I&O's Detail    15 Feb 2025 07:01  -  16 Feb 2025 07:00  --------------------------------------------------------  IN:  Total IN: 0 mL    OUT:    Voided (mL): 650 mL  Total OUT: 650 mL    Total NET: -650 mL        I&O's Summary    15 Feb 2025 07:01  -  16 Feb 2025 07:00  --------------------------------------------------------  IN: 0 mL / OUT: 650 mL / NET: -650 mL      Drug Dosing Weight  DENISE HARRIS      PHYSICAL EXAM:  General: Appears comfortable, alert and cooperative.  HEENT: Normocephalic, atraumatic.  Eyes: Pupils reactive, cornea wnl.  Neck: Supple, no nodes adenopathy; no JVD, carotid bruit or thyromegaly.  CARDIOVASCULAR: Tachycardic, irr irr  LUNGS: Decreased BS at bases  ABDOMEN: Soft, nontender without mass or organomegaly. bowel sounds normoactive.  EXTREMITIES: No clubbing, cyanosis or edema. Distal pulses wnl.   SKIN: Warm and dry with normal turgor.  NEURO: Alert/oriented x 3/normal motor exam  PSYCH: Appropriate affect        LABS:                        13.5   20.69 )-----------( 513      ( 16 Feb 2025 06:03 )             40.8     02-16    135  |  102  |  37.2[H]  ----------------------------<  169[H]  4.1   |  20.0[L]  |  1.03    Ca    8.2[L]      16 Feb 2025 06:03  Phos  3.0     02-16  Mg     2.4     02-16    TPro  6.0[L]  /  Alb  2.2[L]  /  TBili  0.4  /  DBili  x   /  AST  30  /  ALT  50[H]  /  AlkPhos  145[H]  02-16    DENISE HARRIS      PT/INR - ( 14 Feb 2025 22:55 )   PT: 14.7 sec;   INR: 1.30 ratio         PTT - ( 16 Feb 2025 06:03 )  PTT:42.1 sec  Urinalysis Basic - ( 16 Feb 2025 06:03 )    Color: x / Appearance: x / SG: x / pH: x  Gluc: 169 mg/dL / Ketone: x  / Bili: x / Urobili: x   Blood: x / Protein: x / Nitrite: x   Leuk Esterase: x / RBC: x / WBC x   Sq Epi: x / Non Sq Epi: x / Bacteria: x        RADIOLOGY & ADDITIONAL STUDIES:    INTERPRETATION OF TELEMETRY (personally reviewed):  -130s, HR trending down since yesterday    ASSESSMENT AND PLAN:  In summary, DENISE HARRIS is an 59y Male with a history of paroxysmal atrial fibrillation, hypertension, obesity who presented with borderline hypotension, fever, and rapid atrial fibrillation in setting of multifocal pneumonia. Started on digoxin with improvement of HR    - Continue digoxin 0.25 mg PO daily  - Continue metoprolol 50 mg bid  - May add diltiazem if needed, but would allow liberal control of HR in setting of severe pneumonia   - Continue heparin gtt  - Consider checking resp viral panel  - Continue supp oxygen, abx, and supportive care  - F/u echo    
                [place] [facility] - [facility], [facility]                                   [address] [place] [address]                                                      PHONE: [phone]                                                         FAX: [phone]  [email or web address]  ---------------------------------------------------------------------------------------------------------------------------------    Overnight events/patient complaints: Patient states that he is doing well this AM. States he still gets short of breath when he gets out of the bed.      penicillin (Unknown)    MEDICATIONS  (STANDING):  cefTRIAXone Injectable. 2000 milliGRAM(s) IV Push every 24 hours  digoxin     Tablet 250 MICROGram(s) Oral daily  diltiazem    Tablet 60 milliGRAM(s) Oral every 6 hours  heparin  Infusion.  Unit(s)/Hr (19 mL/Hr) IV Continuous <Continuous>  influenza   Vaccine 0.5 milliLiter(s) IntraMuscular once  methylPREDNISolone sodium succinate Injectable 40 milliGRAM(s) IV Push two times a day  metoprolol tartrate 50 milliGRAM(s) Oral two times a day  pantoprazole    Tablet 40 milliGRAM(s) Oral before breakfast    MEDICATIONS  (PRN):  acetaminophen     Tablet .. 650 milliGRAM(s) Oral every 6 hours PRN Temp greater or equal to 38C (100.4F), Mild Pain (1 - 3)  aluminum hydroxide/magnesium hydroxide/simethicone Suspension 30 milliLiter(s) Oral every 4 hours PRN Dyspepsia  benzonatate 100 milliGRAM(s) Oral every 8 hours PRN Cough  guaifenesin/dextromethorphan Oral Liquid 10 milliLiter(s) Oral every 4 hours PRN Cough  heparin   Injectable 9000 Unit(s) IV Push every 6 hours PRN For aPTT less than 40  heparin   Injectable 4000 Unit(s) IV Push every 6 hours PRN For aPTT between 40 - 57  ipratropium    for Nebulization 500 MICROGram(s) Nebulizer every 6 hours PRN Shortness of Breath and/or Wheezing  levalbuterol Inhalation 0.63 milliGRAM(s) Inhalation every 6 hours PRN SOB/Wheezing  melatonin 3 milliGRAM(s) Oral at bedtime PRN Insomnia  metoprolol tartrate Injectable 5 milliGRAM(s) IV Push every 6 hours PRN for HR sustained >130  ondansetron Injectable 4 milliGRAM(s) IV Push every 8 hours PRN Nausea and/or Vomiting      Vital Signs Last 24 Hrs  T(C): 37 (18 Feb 2025 07:59), Max: 37 (18 Feb 2025 07:59)  T(F): 98.6 (18 Feb 2025 07:59), Max: 98.6 (18 Feb 2025 07:59)  HR: 81 (18 Feb 2025 07:59) (81 - 99)  BP: 158/93 (18 Feb 2025 07:59) (136/89 - 158/95)  BP(mean): --  RR: 20 (18 Feb 2025 07:59) (18 - 20)  SpO2: 95% (18 Feb 2025 07:59) (91% - 97%)    Parameters below as of 18 Feb 2025 07:59  Patient On (Oxygen Delivery Method): nasal cannula      ICU Vital Signs Last 24 Hrs  DENISE HARRIS  I&O's Detail    Drug Dosing Weight  DENISE DAVIDFFY      PHYSICAL EXAM:  General: NAD  HEENT: Head; normocephalic, atraumatic.  Eyes: EOMI, conjunctiva normal  Neck: Supple  CARDIOVASCULAR: Irregularly irregular rhythm, regular rate, S1 S2, No murmurs or gallops  ABDOMEN: Soft, nontender  SKIN: warm and dry  NEURO: Alert/oriented x 3  PSYCH: normal affect.        LABS:                        12.8   7.91  )-----------( 540      ( 18 Feb 2025 04:00 )             38.5     02-18    137  |  102  |  28.6[H]  ----------------------------<  155[H]  4.4   |  24.0  |  0.80    Ca    7.8[L]      18 Feb 2025 04:00  Mg     2.6     02-17      DENISE HARRIS      PTT - ( 18 Feb 2025 04:00 )  PTT:169.6 sec  Urinalysis Basic - ( 18 Feb 2025 04:00 )    Color: x / Appearance: x / SG: x / pH: x  Gluc: 155 mg/dL / Ketone: x  / Bili: x / Urobili: x   Blood: x / Protein: x / Nitrite: x   Leuk Esterase: x / RBC: x / WBC x   Sq Epi: x / Non Sq Epi: x / Bacteria: x        RADIOLOGY & ADDITIONAL STUDIES:    INTERPRETATION OF TELEMETRY (personally reviewed): Afib, rate controlled in 80s-90s    ASSESSMENT AND PLAN:  59 year old male with a history of paroxysmal atrial fibrillation, hypertension, obesity who presented with borderline hypotension, fever, and rapid atrial fibrillation in setting of multifocal pneumonia and strept pneumo bacteremia.    Afib/Pneumonia/Bacteremia  - Now rate controlled  - Continue digoxin 0.25 mg PO daily  - Continue metoprolol 50 mg bid  - Continue diltiazem 60mg q6h  - Continue heparin gtt  - Continue supp oxygen, abx, and supportive care  - Echo with preserved EF    Cardiology to sign off at this time. Please call back with any questions.    Thank you for letting [facility] to assist in the management of this patient. Please call with any questions.
Hospitalist Progress Note    Chief Complaint:  acute hypoxic respiratory failure    SUBJECTIVE / OVERNIGHT EVENTS:  No events overnight, patient seen at bedside, in NAD, no new complaints today. Patient denies abd pain, N/V, fever, chills, dysuria or any other complaints. All remainder ROS negative.     MEDICATIONS  (STANDING):  cefepime  Injectable.      cefepime  Injectable. 2000 milliGRAM(s) IV Push every 8 hours  digoxin     Tablet 250 MICROGram(s) Oral daily  heparin  Infusion.  Unit(s)/Hr (19 mL/Hr) IV Continuous <Continuous>  influenza   Vaccine 0.5 milliLiter(s) IntraMuscular once  methylPREDNISolone sodium succinate Injectable 40 milliGRAM(s) IV Push every 8 hours  metoprolol tartrate 50 milliGRAM(s) Oral two times a day  pantoprazole    Tablet 40 milliGRAM(s) Oral before breakfast  vancomycin  IVPB 1500 milliGRAM(s) IV Intermittent <User Schedule>    MEDICATIONS  (PRN):  acetaminophen     Tablet .. 650 milliGRAM(s) Oral every 6 hours PRN Temp greater or equal to 38C (100.4F), Mild Pain (1 - 3)  aluminum hydroxide/magnesium hydroxide/simethicone Suspension 30 milliLiter(s) Oral every 4 hours PRN Dyspepsia  benzonatate 100 milliGRAM(s) Oral every 8 hours PRN Cough  guaifenesin/dextromethorphan Oral Liquid 10 milliLiter(s) Oral every 4 hours PRN Cough  heparin   Injectable 9000 Unit(s) IV Push every 6 hours PRN For aPTT less than 40  heparin   Injectable 4000 Unit(s) IV Push every 6 hours PRN For aPTT between 40 - 57  ipratropium    for Nebulization 500 MICROGram(s) Nebulizer every 6 hours PRN Shortness of Breath and/or Wheezing  levalbuterol Inhalation 0.63 milliGRAM(s) Inhalation every 6 hours PRN SOB/Wheezing  melatonin 3 milliGRAM(s) Oral at bedtime PRN Insomnia  metoprolol tartrate Injectable 5 milliGRAM(s) IV Push every 6 hours PRN for HR sustained >130  ondansetron Injectable 4 milliGRAM(s) IV Push every 8 hours PRN Nausea and/or Vomiting        I&O's Summary    15 Feb 2025 07:01  -  16 Feb 2025 07:00  --------------------------------------------------------  IN: 0 mL / OUT: 650 mL / NET: -650 mL        PHYSICAL EXAM:  Vital Signs Last 24 Hrs  T(C): 36.9 (16 Feb 2025 07:26), Max: 37.6 (15 Feb 2025 13:15)  T(F): 98.5 (16 Feb 2025 07:26), Max: 99.7 (15 Feb 2025 13:15)  HR: 107 (16 Feb 2025 07:26) (97 - 154)  BP: 131/84 (16 Feb 2025 07:26) (113/84 - 140/87)  BP(mean): 100 (16 Feb 2025 07:26) (99 - 107)  RR: 18 (16 Feb 2025 07:26) (16 - 22)  SpO2: 94% (16 Feb 2025 08:44) (94% - 100%)    Parameters below as of 16 Feb 2025 08:44  Patient On (Oxygen Delivery Method): nasal cannula, high flow          General: Appears comfortable, alert and cooperative.  HEENT: Normocephalic, atraumatic.  Eyes: Pupils reactive, cornea wnl.  Neck: Supple, no nodes adenopathy; no JVD, carotid bruit or thyromegaly.  CARDIOVASCULAR: Tachycardic, irr irr  LUNGS: Decreased BS at bases  ABDOMEN: Soft, nontender without mass or organomegaly. bowel sounds normoactive.  EXTREMITIES: No clubbing, cyanosis or edema. Distal pulses wnl.   SKIN: Warm and dry with normal turgor.  NEURO: Alert/oriented x 3/normal motor exam  PSYCH: Appropriate affect      LABS:                        13.5   20.69 )-----------( 513      ( 16 Feb 2025 06:03 )             40.8     02-16    135  |  102  |  37.2[H]  ----------------------------<  169[H]  4.1   |  20.0[L]  |  1.03    Ca    8.2[L]      16 Feb 2025 06:03  Phos  3.0     02-16  Mg     2.4     02-16    TPro  6.0[L]  /  Alb  2.2[L]  /  TBili  0.4  /  DBili  x   /  AST  30  /  ALT  50[H]  /  AlkPhos  145[H]  02-16    PT/INR - ( 14 Feb 2025 22:55 )   PT: 14.7 sec;   INR: 1.30 ratio         PTT - ( 16 Feb 2025 06:03 )  PTT:42.1 sec      Urinalysis Basic - ( 16 Feb 2025 06:03 )    Color: x / Appearance: x / SG: x / pH: x  Gluc: 169 mg/dL / Ketone: x  / Bili: x / Urobili: x   Blood: x / Protein: x / Nitrite: x   Leuk Esterase: x / RBC: x / WBC x   Sq Epi: x / Non Sq Epi: x / Bacteria: x        Culture - Blood (collected 14 Feb 2025 22:55)  Source: .Blood BLOOD  Gram Stain (15 Feb 2025 16:12):    Growth in aerobic bottle: Gram Positive Cocci in Pairs and Chains  Preliminary Report (15 Feb 2025 16:12):    Growth in aerobic bottle: Gram Positive Cocci in Pairs and Chains    Direct identification is available within approximately 3-5    hours either by Blood Panel Multiplexed PCR or Direct    MALDI-TOF. Details: [email or web address]  Organism: Blood Culture PCR (15 Feb 2025 18:11)  Organism: Blood Culture PCR (15 Feb 2025 18:11)      CAPILLARY BLOOD GLUCOSE            RADIOLOGY & ADDITIONAL TESTS:  Results Reviewed: Y  Imaging Personally Reviewed: N  Electrocardiogram Personally Reviewed: RUBI                                          
[facility]  [facility] DISEAS[facility] at [place] [facility] [place] [facility]  =======================================================                              Gabe Hopper MD                              Professor Emeritus:  Dr Vinh Bergeron MD            Diplomates American Board of Internal Medicine & Infectious Diseases                                   Tel  [phone] Fax [phone]                                  Hospital Consult line:  [phone]  =======================================================    Follow Up: Bacteremia     Interval History/ROS: Seen at bedside. Repeat blood cultures have remained negative.     REVIEW OF SYSTEMS  Unchanged from prior     Allergies  penicillin (Unknown)    ANTIMICROBIALS:    cefTRIAXone Injectable. 2000 every 24 hours    OTHER MEDS: MEDICATIONS  (STANDING):  acetaminophen     Tablet .. 650 every 6 hours PRN  aluminum hydroxide/magnesium hydroxide/simethicone Suspension 30 every 4 hours PRN  apixaban 5 two times a day  benzonatate 100 every 8 hours PRN  digoxin     Tablet 250 daily  diltiazem    Tablet 60 every 6 hours  guaifenesin/dextromethorphan Oral Liquid 10 every 4 hours PRN  influenza   Vaccine 0.5 once  ipratropium    for Nebulization 500 every 6 hours PRN  levalbuterol Inhalation 0.63 every 6 hours PRN  melatonin 3 at bedtime PRN  methylPREDNISolone sodium succinate Injectable 40 two times a day  metoprolol tartrate 50 two times a day  metoprolol tartrate Injectable 5 every 6 hours PRN  ondansetron Injectable 4 every 8 hours PRN  pantoprazole    Tablet 40 before breakfast    Vital Signs Last 24 Hrs  T(F): 97.6 (02-19-25 @ 08:03), Max: 101.7 (02-15-25 @ 02:02)    Vital Signs Last 24 Hrs  HR: 86 (02-19-25 @ 08:03) (86 - 110)  BP: 145/95 (02-19-25 @ 08:03) (141/96 - 159/90)  RR: 20 (02-19-25 @ 08:03)  SpO2: 94% (02-19-25 @ 08:03) (92% - 96%)  Wt(kg): --    EXAM:  General: NAD   HEENT: NCAT, EOMI  CV: S1+S2  Lungs: No acute respiratory distress, CTAB, on NC O2   Abd: Soft, nontender  Ext: No cyanosis  Neuro: Alert and oriented, no focal deficits   Skin: No rash     Labs:                        12.7   7.87  )-----------( 513      ( 19 Feb 2025 04:21 )             37.1     02-19    136  |  101  |  27.5[H]  ----------------------------<  137[H]  4.8   |  27.0  |  0.69    Ca    7.8[L]      19 Feb 2025 04:21    WBC Trend:  WBC Count: 7.87 (02-19-25 @ 04:21)  WBC Count: 7.91 (02-18-25 @ 04:00)  WBC Count: 12.29 (02-17-25 @ 03:11)  WBC Count: 20.69 (02-16-25 @ 06:03)    Creatine Trend:  Creatinine: 0.69 (02-19)  Creatinine: 0.80 (02-18)  Creatinine: 0.92 (02-17)  Creatinine: 1.03 (02-16)    Liver Biochemical Testing Trend:  Alanine Aminotransferase (ALT/SGPT): 50 *H* (02-16)  Alanine Aminotransferase (ALT/SGPT): 61 *H* (02-15)  Alanine Aminotransferase (ALT/SGPT): 45 *H* (02-14)  Aspartate Aminotransferase (AST/SGOT): 30 (02-16-25 @ 06:03)  Aspartate Aminotransferase (AST/SGOT): 74 (02-15-25 @ 08:24)  Aspartate Aminotransferase (AST/SGOT): 51 (02-14-25 @ 22:55)  Bilirubin Total: 0.4 (02-16)  Bilirubin Total: 0.8 (02-15)  Bilirubin Total: 0.8 (02-14)    Urinalysis Basic - ( 19 Feb 2025 04:21 )    Color: x / Appearance: x / SG: x / pH: x  Gluc: 137 mg/dL / Ketone: x  / Bili: x / Urobili: x   Blood: x / Protein: x / Nitrite: x   Leuk Esterase: x / RBC: x / WBC x   Sq Epi: x / Non Sq Epi: x / Bacteria: x    MICROBIOLOGY:    MRSA PCR Result.: NotDetec (02-16-25 @ 05:38)    Culture - Blood (collected 18 Feb 2025 03:45)  Source: .Blood BLOOD  Preliminary Report:    No growth at 24 hours    Culture - Blood (collected 16 Feb 2025 06:03)  Source: .Blood BLOOD  Preliminary Report:    No growth at 48 Hours    Culture - Blood (collected 14 Feb 2025 22:55)  Source: .Blood BLOOD  Final Report:    Growth in aerobic and anaerobic bottles: Streptococcus pneumoniae    Direct identification is available within approximately 3-5    hours either by Blood Panel Multiplexed PCR or Direct    MALDI-TOF. Details: [email or web address]  Organism: Blood Culture PCR  Streptococcus pneumoniae  Organism: Streptococcus pneumoniae    Sensitivities:      Method Type: CARMELO      -  Clindamycin: S <=0.06      -  Erythromycin: S 0.12 Predicts results for azithromycin.      -  Levofloxacin: S 1      -  Tetracycline: R >4      -  Trimethoprim/Sulfamethoxazole: S <=.25/4.75      -  Vancomycin: S 0.5      -  Ceftriaxone (meningitidis): S <=0.25      -  Ceftriaxone (non-meningitidis): S <=0.25      -  Penicillin (meningitidis): S <=0.03      -  Penicillin (non-meningitidis): S <=0.03      -  Penicillin (oral penicillin V): S <=0.03  Organism: Blood Culture PCR    Sensitivities:      Method Type: PCR      -  Streptococcus pneumoniae: Detec    Legionella Antigen, Urine: Negative (02-16 @ 01:26)  Rapid RVP Result: NotDetec (02-15 @ 08:24)    Procalcitonin: 4.36 (02-15)    Troponin T, High Sensitivity Result: 14 (02-14)    RADIOLOGY:  imaging below personally reviewed    < from: CT Angio Chest PE Protocol w/ IV Cont (02.15.25 @ 01:37) >  IMPRESSION:  Large patchy and confluent airspace consolidations in the right upper and   lower lobes consistent with multifocal pneumonia.  Trace right pleural   effusion.    Mediastinal and right hilar lymphadenopathy, probably reactive to acute   infection.    No pulmonary embolism.    Mild fusiform aneurysmal dilatation of the aortic root to 4.1 cm in   transverse caliber at the sinus of Valsalva.  Ectatic mid ascending   thoracic aorta measures approximately 3.7 cm in transverse caliber.  The   patient may follow-up with thoracic surgery for long-term management.    < end of copied text >

## 2025-02-19 NOTE — PROGRESS NOTE ADULT - ASSESSMENT
59 M PMH AF, HTN, who presented here with SOB and non productive cough.     Recent viral upper respiratory illness early January   Former cigarette smoker  Currently smokes occasional cigars   Cough, SOB  Fevers   Acute hypoxic respiratory failure   AF w/ RVR   Leukocytosis, acute renal failure, lactic acidosis -- IMPROVED   Elevated procalcitonin 4.36  Strep Pneumoniae bacteremia from multifocal pneumonia   Abnormal CT Chest, showed multifocal pneumonia as well as aortic root aneurysm  Severe sepsis     Suggest;  Continue Ceftriaxone 2g IV Q24h   Solumedrol taper as per Primary Team   Blood cultures from 2/14 were positive for Strep Pneumoniae   Repeat blood cultures from 2/16 are negative to date   TTE 2/17 no vegetations mentioned   Would avoid Fluoroquinolones given aortic root aneurysm - pt will require outpatient follow up with Thoracic Surgery for monitoring of said aneurysm   I also informed patient that he should get PCV-20 vaccination in near future once his current illness has resolved   Monitor fever curve and WBC count   Monitor kidney and liver function daily while admitted on IV antibiotics     Plan for 2 weeks of Ceftriaxone 2g IV Q24h post negative blood cultures through March 2nd 2025. Weekly CBC and CMP to be faxed to .   He should follow up with me near the end of his Ceftriaxone IV course to see if he needs a course of PO antibiotics afterwards     Case discussed with patient at bedside and with Dr Sena. Script given to .  59 M PMH AF, HTN, who presented here with SOB and non productive cough.     Recent viral upper respiratory illness early January   Former cigarette smoker  Currently smokes occasional cigars   Cough, SOB  Fevers   Acute hypoxic respiratory failure   AF w/ RVR   Leukocytosis, acute renal failure, lactic acidosis -- IMPROVED   Elevated procalcitonin 4.36  Strep Pneumoniae bacteremia from multifocal pneumonia   Abnormal CT Chest, showed multifocal pneumonia as well as aortic root aneurysm  Severe sepsis     H/o PCN allergy as a teenager (anaphylaxis). Tolerating Ceftriaxone IV well.     Suggest;  Continue Ceftriaxone 2g IV Q24h   Solumedrol taper as per Primary Team   Blood cultures from 2/14 were positive for Strep Pneumoniae   Repeat blood cultures from 2/16 are negative to date   TTE 2/17 no vegetations mentioned   Would avoid Fluoroquinolones given aortic root aneurysm - pt will require outpatient follow up with Thoracic Surgery for monitoring of said aneurysm   I also informed patient that he should get PCV-20 vaccination in near future once his current illness has resolved   Monitor fever curve and WBC count   Monitor kidney and liver function daily while admitted on IV antibiotics     Plan for 2 weeks of Ceftriaxone 2g IV Q24h post negative blood cultures through March 2nd 2025. Weekly CBC and CMP to be faxed to .   He should follow up with me near the end of his Ceftriaxone IV course to see if he needs a course of PO antibiotics afterwards     Case discussed with patient at bedside and with Dr Sena. Script given to .

## 2025-02-19 NOTE — DISCHARGE NOTE PROVIDER - CARE PROVIDER_API CALL
Leobardo Armenta  Cardiovascular Disease  [address] [place] [address]  Phone: [phone]  Follow Up Time: 2 weeks    Lola Hopper  Infectious Disease  [address]  [place] [address]  Phone: [phone]  Fax: [phone]  Follow Up Time: 2 weeks

## 2025-02-19 NOTE — DISCHARGE NOTE PROVIDER - CARE PROVIDERS DIRECT ADDRESSES
[email or web address]

## 2025-02-19 NOTE — DISCHARGE NOTE PROVIDER - NSDCCPCAREPLAN_GEN_ALL_CORE_FT
PRINCIPAL DISCHARGE DIAGNOSIS  Diagnosis: Sepsis with acute hypoxic respiratory failure  Assessment and Plan of Treatment: Take antibiotics through the midline as prescribed. I have sent you a tapering dose of prednisone, please take those as prescribed as well. Follow up with your primary care doctor and Infectious disease outpatient within 2 weeks as discussed.      SECONDARY DISCHARGE DIAGNOSES  Diagnosis: PNA (pneumonia)  Assessment and Plan of Treatment: plan as above    Diagnosis: Atrial fibrillation with RVR  Assessment and Plan of Treatment: You now have persistent Atrial Fibrillation. please take all cardiac medications as prescribed. I have started you on eliquis as a blood thinner to take twice a day. Please follow up with your outpatient cardiologist within 2 weeks of discharge.

## 2025-02-19 NOTE — DISCHARGE NOTE PROVIDER - NSDCMRMEDTOKEN_GEN_ALL_CORE_FT
apixaban 5 mg oral tablet: 1 tab(s) orally 2 times a day  Aspir 81 oral delayed release tablet: 1 tab(s) orally once a day  benzonatate 100 mg oral capsule: 1 cap(s) orally every 8 hours as needed for Cough  cefTRIAXone 2 g injection: 2 gram(s) intravenously once a day through March 2nd 2025. Weekly CBC and CMP to be faxed to   cefTRIAXone 2 g injection: 2 gram(s) intravenously once a day through March 2nd 2025. Weekly CBC and CMP to be faxed to .  digoxin 250 mcg (0.25 mg) oral tablet: 1 tab(s) orally once a day  dilTIAZem 240 mg/24 hours oral tablet, extended release: 1 tab(s) orally once a day  levalbuterol 0.63 mg/3 mL inhalation solution: 3 milliliter(s) inhaled every 6 hours as needed for SOB/Wheezing  pantoprazole 40 mg oral delayed release tablet: 1 tab(s) orally once a day (before a meal)  predniSONE 10 mg oral tablet: 4 tab(s) orally once a day 4 tabs 3 days; 3 tabs 3 days; 2 tabs 3 days; 1 tabs 3 days  Toprol- mg oral tablet, extended release: 1 tab(s) orally once a day

## 2025-02-19 NOTE — PROGRESS NOTE ADULT - REASON FOR ADMISSION
acute hypoxic respiratory failure

## 2025-02-20 VITALS
DIASTOLIC BLOOD PRESSURE: 74 MMHG | RESPIRATION RATE: 20 BRPM | SYSTOLIC BLOOD PRESSURE: 109 MMHG | OXYGEN SATURATION: 91 % | HEART RATE: 110 BPM | TEMPERATURE: 98 F

## 2025-02-21 LAB
CULTURE RESULTS: SIGNIFICANT CHANGE UP
SPECIMEN SOURCE: SIGNIFICANT CHANGE UP

## 2025-02-23 LAB
CULTURE RESULTS: SIGNIFICANT CHANGE UP
SPECIMEN SOURCE: SIGNIFICANT CHANGE UP

## 2025-02-24 PROBLEM — I10 ESSENTIAL (PRIMARY) HYPERTENSION: Chronic | Status: ACTIVE | Noted: 2025-02-15

## 2025-02-24 PROBLEM — I48.0 PAROXYSMAL ATRIAL FIBRILLATION: Chronic | Status: ACTIVE | Noted: 2025-02-15

## 2025-03-05 ENCOUNTER — APPOINTMENT (OUTPATIENT)
Dept: INFECTIOUS DISEASE | Facility: CLINIC | Age: 60
End: 2025-03-05
Payer: COMMERCIAL

## 2025-03-05 VITALS
SYSTOLIC BLOOD PRESSURE: 112 MMHG | BODY MASS INDEX: 34.07 KG/M2 | HEIGHT: 69 IN | WEIGHT: 230 LBS | DIASTOLIC BLOOD PRESSURE: 68 MMHG

## 2025-03-05 DIAGNOSIS — Z87.898 PERSONAL HISTORY OF OTHER SPECIFIED CONDITIONS: ICD-10-CM

## 2025-03-05 PROCEDURE — 99213 OFFICE O/P EST LOW 20 MIN: CPT

## 2025-03-06 LAB — PROCALCITONIN SERPL-MCNC: 0.07 NG/ML

## 2025-03-11 LAB — BACTERIA BLD CULT: NORMAL

## 2025-04-02 ENCOUNTER — RESULT REVIEW (OUTPATIENT)
Age: 60
End: 2025-04-02

## 2025-04-02 ENCOUNTER — TRANSCRIPTION ENCOUNTER (OUTPATIENT)
Age: 60
End: 2025-04-02

## 2025-04-02 ENCOUNTER — OUTPATIENT (OUTPATIENT)
Dept: OUTPATIENT SERVICES | Facility: HOSPITAL | Age: 60
LOS: 1 days | End: 2025-04-02
Payer: COMMERCIAL

## 2025-04-02 VITALS
OXYGEN SATURATION: 95 % | RESPIRATION RATE: 12 BRPM | DIASTOLIC BLOOD PRESSURE: 92 MMHG | HEART RATE: 58 BPM | SYSTOLIC BLOOD PRESSURE: 129 MMHG

## 2025-04-02 VITALS
DIASTOLIC BLOOD PRESSURE: 102 MMHG | OXYGEN SATURATION: 99 % | RESPIRATION RATE: 18 BRPM | SYSTOLIC BLOOD PRESSURE: 156 MMHG | HEIGHT: 69 IN | HEART RATE: 71 BPM | WEIGHT: 229.94 LBS | TEMPERATURE: 98 F

## 2025-04-02 DIAGNOSIS — I48.91 UNSPECIFIED ATRIAL FIBRILLATION: ICD-10-CM

## 2025-04-02 LAB
ANION GAP SERPL CALC-SCNC: 11 MMOL/L — SIGNIFICANT CHANGE UP (ref 5–17)
BUN SERPL-MCNC: 19.2 MG/DL — SIGNIFICANT CHANGE UP (ref 8–20)
CALCIUM SERPL-MCNC: 8.7 MG/DL — SIGNIFICANT CHANGE UP (ref 8.4–10.5)
CHLORIDE SERPL-SCNC: 105 MMOL/L — SIGNIFICANT CHANGE UP (ref 96–108)
CO2 SERPL-SCNC: 26 MMOL/L — SIGNIFICANT CHANGE UP (ref 22–29)
CREAT SERPL-MCNC: 0.83 MG/DL — SIGNIFICANT CHANGE UP (ref 0.5–1.3)
EGFR: 100 ML/MIN/1.73M2 — SIGNIFICANT CHANGE UP
EGFR: 100 ML/MIN/1.73M2 — SIGNIFICANT CHANGE UP
GLUCOSE SERPL-MCNC: 100 MG/DL — HIGH (ref 70–99)
HCT VFR BLD CALC: 40.9 % — SIGNIFICANT CHANGE UP (ref 39–50)
HGB BLD-MCNC: 13.3 G/DL — SIGNIFICANT CHANGE UP (ref 13–17)
MAGNESIUM SERPL-MCNC: 2 MG/DL — SIGNIFICANT CHANGE UP (ref 1.6–2.6)
MCHC RBC-ENTMCNC: 29.4 PG — SIGNIFICANT CHANGE UP (ref 27–34)
MCHC RBC-ENTMCNC: 32.5 G/DL — SIGNIFICANT CHANGE UP (ref 32–36)
MCV RBC AUTO: 90.3 FL — SIGNIFICANT CHANGE UP (ref 80–100)
NRBC # BLD AUTO: 0 K/UL — SIGNIFICANT CHANGE UP (ref 0–0)
NRBC # FLD: 0 K/UL — SIGNIFICANT CHANGE UP (ref 0–0)
NRBC BLD AUTO-RTO: 0 /100 WBCS — SIGNIFICANT CHANGE UP (ref 0–0)
PLATELET # BLD AUTO: 283 K/UL — SIGNIFICANT CHANGE UP (ref 150–400)
PMV BLD: 9.1 FL — SIGNIFICANT CHANGE UP (ref 7–13)
POTASSIUM SERPL-MCNC: 4.3 MMOL/L — SIGNIFICANT CHANGE UP (ref 3.5–5.3)
POTASSIUM SERPL-SCNC: 4.3 MMOL/L — SIGNIFICANT CHANGE UP (ref 3.5–5.3)
RBC # BLD: 4.53 M/UL — SIGNIFICANT CHANGE UP (ref 4.2–5.8)
RBC # FLD: 14.3 % — SIGNIFICANT CHANGE UP (ref 10.3–14.5)
SODIUM SERPL-SCNC: 142 MMOL/L — SIGNIFICANT CHANGE UP (ref 135–145)
WBC # BLD: 7.07 K/UL — SIGNIFICANT CHANGE UP (ref 3.8–10.5)
WBC # FLD AUTO: 7.07 K/UL — SIGNIFICANT CHANGE UP (ref 3.8–10.5)

## 2025-04-02 PROCEDURE — 93325 DOPPLER ECHO COLOR FLOW MAPG: CPT

## 2025-04-02 PROCEDURE — 80048 BASIC METABOLIC PNL TOTAL CA: CPT

## 2025-04-02 PROCEDURE — 36415 COLL VENOUS BLD VENIPUNCTURE: CPT

## 2025-04-02 PROCEDURE — 93320 DOPPLER ECHO COMPLETE: CPT

## 2025-04-02 PROCEDURE — 93312 ECHO TRANSESOPHAGEAL: CPT

## 2025-04-02 PROCEDURE — 92960 CARDIOVERSION ELECTRIC EXT: CPT

## 2025-04-02 PROCEDURE — 83735 ASSAY OF MAGNESIUM: CPT

## 2025-04-02 PROCEDURE — 85027 COMPLETE CBC AUTOMATED: CPT

## 2025-04-02 PROCEDURE — 93005 ELECTROCARDIOGRAM TRACING: CPT

## 2025-04-02 PROCEDURE — 93010 ELECTROCARDIOGRAM REPORT: CPT

## 2025-04-02 NOTE — DISCHARGE NOTE PROVIDER - NSDCMRMEDTOKEN_GEN_ALL_CORE_FT
apixaban 5 mg oral tablet: 1 tab(s) orally 2 times a day  Aspir 81 oral delayed release tablet: 1 tab(s) orally once a day  dilTIAZem 240 mg/24 hours oral tablet, extended release: 1 tab(s) orally once a day  levalbuterol 0.63 mg/3 mL inhalation solution: 3 milliliter(s) inhaled every 6 hours as needed for SOB/Wheezing  Nebulizer: Use up to 4 times a day for cough and shortness of breath  Toprol- mg oral tablet, extended release: 1 tab(s) orally once a day

## 2025-04-02 NOTE — PROGRESS NOTE ADULT - SUBJECTIVE AND OBJECTIVE BOX
Pt doing well s/p uncomplicated ANGEL & DCCV (250J x 1). Denies complaint.     Exam:   VSS, NAD, A&O x 3  Skin: no erythema/edema/blistering at defib pad sites.   Card: S1/S2, RRR, no m/g/r  Resp: lungs CTA b/l  Abd: S/NT/ND  Ext: no edema, distal pulses intact    ANGEL: LVEF WNL. No RACHNA thrombus.   EKG: Sinus rhythm with intact conduction.       Assessment:   59y Male h/o pAF (now persistent on Eliquis 5mg Q12HR) HTN, COPD, L eye melanoma s/p resection, who presented electively today 4/2/25 for and is now post ANGEL negative for RACHNA thrombus and uncomplicated DCCV (250J x 1) with restoration of sinus rhythm. Pt denies any complaints post procedure.     Plan:   Observation on telemetry per post op protocol.    Resume PO intake.   Ambulate w/ assist once fully awake & back to baseline mental status w/ VSS.  Continue Eliquis 5mg Q12HR. Importance of strict compliance with anticoagulation regimen reinforced with pt.   Stop Digoxin  Resume other home medications.   Anticipate d/c home once all criteria met, with outpt f/up in 1 month.

## 2025-04-02 NOTE — DISCHARGE NOTE PROVIDER - NSDCCPTREATMENT_GEN_ALL_CORE_FT
PRINCIPAL PROCEDURE  Procedure: Transesophageal echocardiography (ANGEL) with external cardioverison  Findings and Treatment: -Take each dose of your anticoagulation medication (blood thinner) exactly as prescribed.   -Resume your diet with soft foods first.  - Do not drive, operate heavy machinery or make important decisions for 24 hours following the procedure.  - You may resume all other activities the day after the procedure.  Call your doctor if:   -you develop a fever, cough up blood, have any chest pain, palpitations or difficulty breathing. You may take a throat lozenge if you develop a sore throat or try warm salt water gargle.   - your rapid heart rhythm returns.  - you have any questions or concerns regarding the procedure.  If you experience increased difficulty breathing or chest pain, or if you faint, have dizzy spells, or any other distressing symptom, please seek immediate medical attention.

## 2025-04-02 NOTE — DISCHARGE NOTE PROVIDER - CARE PROVIDER_API CALL
Ian Tello  Cardiology  [address] [place] [address]  Phone: [phone]  Fax: [phone]  Follow Up Time:

## 2025-04-02 NOTE — H&P PST ADULT - HISTORY OF PRESENT ILLNESS
Pt is a 60 y/o male with history of pAF (now persistent on Eliquis 5mg Q12HR) and HTN, who presents electively today for a ANGEL / DCCV with Dr. Tello. Pt reports being diagnosed with pAF several years ago for which he was initially maintained on Aspirin 81mg QD for a CHADSVASc of 1. In February of this year pt was admitted to [facility] for treatement of PNA and was eventually discharged with a PICC line for continued abx therapy. During that  hospitalization pt was noted to be in persistent atrial fibrillation which was managed with rate control due to his infection status. Pt now presents for an elective ANGEL / DCCV. Pt reports...  Pt is a 60 y/o male with history of pAF (now persistent on Eliquis 5mg Q12HR) and HTN, who presents electively today for a ANGEL / DCCV with Dr. Tello. Pt reports being diagnosed with pAF several years ago for which he was initially maintained on Aspirin 81mg QD for a CHADSVASc of 1. In February of this year pt was admitted to [facility] for treatement of PNA and was eventually discharged with a PICC line for continued abx therapy. During that  hospitalization pt was noted to be in persistent atrial fibrillation which was managed with rate control due to his infection status. Pt now presents for an elective ANGEL / DCCV. Pt reports strict compliance with Eliquis 5mg Q12HR (last dose this AM). Pt confirms last PO intake (besides meds with sips of water) was > 8 hours PTA. Pt currently w/o complaints at time of assessment.    Cardiology Summary:   TTE 2/17/2025: Left ventricular systolic function is normal with an ejection fraction of 68 Pt is a 60 y/o male with history of pAF (now persistent on Eliquis 5mg Q12HR) HTN, COPD, L eye melanoma s/p resection, who presents electively today for a ANGEL / DCCV with Dr. Tello. Pt reports being diagnosed with pAF several years ago for which he was initially maintained on Aspirin 81mg QD for a CHADSVASc of 1. In February of this year pt was admitted to [facility] for treatement of PNA and was eventually discharged with a PICC line for continued abx therapy. During that  hospitalization pt was noted to be in persistent atrial fibrillation which was managed with rate control due to his infection status. Pt now presents for an elective ANGEL / DCCV. Pt reports strict compliance with Eliquis 5mg Q12HR (last dose this AM). Pt confirms last PO intake (besides meds with sips of water) was > 8 hours PTA. Pt currently w/o complaints at time of assessment.    Cardiology Summary:   TTE 2/17/2025: Left ventricular systolic function is normal with an ejection fraction of 68

## 2025-04-02 NOTE — DISCHARGE NOTE NURSING/CASE MANAGEMENT/SOCIAL WORK - FINANCIAL ASSISTANCE
[facility] provides services at a reduced cost to those who are determined to be eligible through [facility]’s financial assistance program. Information regarding [facility]’s financial assistance program can be found by going to [email or web address] or by calling [phone].

## 2025-04-02 NOTE — DISCHARGE NOTE PROVIDER - HOSPITAL COURSE
59y Male h/o pAF (now persistent on Eliquis 5mg Q12HR) HTN, COPD, L eye melanoma s/p resection, who presented electively today 4/2/25 for and is now post ANGEL negative for RACHNA thrombus and uncomplicated DCCV (250J x 1) with restoration of sinus rhythm. Pt denied any complaints post procedure.

## 2025-04-02 NOTE — H&P PST ADULT - ASSESSMENT
Pt is a 58 y/o male with history of pAF (now persistent on Eliquis 5mg Q12HR) and HTN, who presents electively today for a ANGEL / DCCV with Dr. Tello. Pt reports being diagnosed with pAF several years ago for which he was initially maintained on Aspirin 81mg QD for a CHADSVASc of 1. In February of this year pt was admitted to [facility] for treatement of PNA and was eventually discharged with a PICC line for continued abx therapy. During that  hospitalization pt was noted to be in persistent atrial fibrillation which was managed with rate control due to his infection status. Pt now presents for an elective ANGEL / DCCV. Pt reports strict compliance with Eliquis 5mg Q12HR (last dose this AM). Pt confirms last PO intake (besides meds with sips of water) was > 8 hours PTA. Pt currently w/o complaints at time of assessment.    Plan:  1) ANGEL / DCCV  - Labs / EKG  - Consent with attending  - Site prep per protocol  - Maintain NPO status pending procedure Pt is a 60 y/o male with history of pAF (now persistent on Eliquis 5mg Q12HR) HTN, COPD, L eye melanoma s/p resection, who presents electively today for a ANGEL / DCCV with Dr. Tello. Pt reports being diagnosed with pAF several years ago for which he was initially maintained on Aspirin 81mg QD for a CHADSVASc of 1. In February of this year pt was admitted to [facility] for treatement of PNA and was eventually discharged with a PICC line for continued abx therapy. During that  hospitalization pt was noted to be in persistent atrial fibrillation which was managed with rate control due to his infection status. Pt now presents for an elective ANGEL / DCCV. Pt reports strict compliance with Eliquis 5mg Q12HR (last dose this AM). Pt confirms last PO intake (besides meds with sips of water) was > 8 hours PTA. Pt currently w/o complaints at time of assessment.      Plan:  1) ANGEL / DCCV  - Labs / EKG  - Consent with attending  - Site prep per protocol  - Maintain NPO status pending procedure

## 2025-04-02 NOTE — H&P PST ADULT - NS SC CAGE ALCOHOL CUT DOWN
MD Maria R Spann, RN   Please schedule this patient for a clinic visit to discuss possible liver cirrhosis             Previous Messages       ----- Message -----   From: Albert Sierra ([facility])   Sent: 10/22/2021   6:52 AM CDT   To: Richard BECKETT MD ([facility])   Subject: Referral on 10/21/2021       no

## 2025-04-02 NOTE — DISCHARGE NOTE PROVIDER - EXTENDED VTE YES NO FOR MLM ENOXAPARIN
[de-identified] : f/u for acne;  using tretinoin;\par has good effect from TCA txs, but c/o intermittent flareups\par did not do well with po txs; \par doing better with addition of aczone 5% gel
,